# Patient Record
Sex: MALE | Race: WHITE | NOT HISPANIC OR LATINO | URBAN - METROPOLITAN AREA
[De-identification: names, ages, dates, MRNs, and addresses within clinical notes are randomized per-mention and may not be internally consistent; named-entity substitution may affect disease eponyms.]

---

## 2024-11-15 ENCOUNTER — TELEPHONE (OUTPATIENT)
Dept: NEUROLOGY | Facility: CLINIC | Age: 69
End: 2024-11-15
Payer: COMMERCIAL

## 2024-11-15 NOTE — TELEPHONE ENCOUNTER
Called patient again with no answer.  Referral needed for scheduling.  Patient to contact the office back.

## 2024-11-15 NOTE — TELEPHONE ENCOUNTER
----- Message from Ghada sent at 11/15/2024  1:04 PM CST -----  Regarding: No availability  Contact: Pt @[phone]  Pt called in to schedule an appt; no available appts in Epic. Pt is asking for a call back soon to schedule. Thanks.         Patient's DX: movement disorders         Patient requesting call back or [facility] [phone]

## 2024-11-18 ENCOUNTER — TELEPHONE (OUTPATIENT)
Dept: NEUROLOGY | Facility: CLINIC | Age: 69
End: 2024-11-18
Payer: COMMERCIAL

## 2024-11-18 NOTE — TELEPHONE ENCOUNTER
----- Message from Aissatou Chacko sent at 11/18/2024  1:32 PM CST -----  Regarding: Returning Missed Call  Contact: [phone]  Returning a Missed Call    Caller: Patient     Returning call to: CAROL Shelby     Caller can be reached @: [phone]

## 2024-12-02 ENCOUNTER — PATIENT MESSAGE (OUTPATIENT)
Dept: NEUROLOGY | Facility: CLINIC | Age: 69
End: 2024-12-02

## 2024-12-02 ENCOUNTER — OFFICE VISIT (OUTPATIENT)
Dept: NEUROLOGY | Facility: CLINIC | Age: 69
End: 2024-12-02
Payer: COMMERCIAL

## 2024-12-02 ENCOUNTER — LAB VISIT (OUTPATIENT)
Dept: LAB | Facility: HOSPITAL | Age: 69
End: 2024-12-02
Attending: STUDENT IN AN ORGANIZED HEALTH CARE EDUCATION/TRAINING PROGRAM
Payer: COMMERCIAL

## 2024-12-02 VITALS
DIASTOLIC BLOOD PRESSURE: 77 MMHG | HEART RATE: 67 BPM | SYSTOLIC BLOOD PRESSURE: 124 MMHG | HEIGHT: 63 IN | WEIGHT: 167 LBS | BODY MASS INDEX: 29.59 KG/M2

## 2024-12-02 DIAGNOSIS — R26.9 ABNORMAL GAIT: ICD-10-CM

## 2024-12-02 DIAGNOSIS — R26.89 BALANCE DISORDER: Primary | ICD-10-CM

## 2024-12-02 PROCEDURE — 36415 COLL VENOUS BLD VENIPUNCTURE: CPT | Performed by: STUDENT IN AN ORGANIZED HEALTH CARE EDUCATION/TRAINING PROGRAM

## 2024-12-02 PROCEDURE — 99205 OFFICE O/P NEW HI 60 MIN: CPT | Mod: S$GLB,,, | Performed by: STUDENT IN AN ORGANIZED HEALTH CARE EDUCATION/TRAINING PROGRAM

## 2024-12-02 PROCEDURE — 99999 PR PBB SHADOW E&M-EST. PATIENT-LVL III: CPT | Mod: PBBFAC,,, | Performed by: STUDENT IN AN ORGANIZED HEALTH CARE EDUCATION/TRAINING PROGRAM

## 2024-12-02 PROCEDURE — 84425 ASSAY OF VITAMIN B-1: CPT | Performed by: STUDENT IN AN ORGANIZED HEALTH CARE EDUCATION/TRAINING PROGRAM

## 2024-12-02 PROCEDURE — 82607 VITAMIN B-12: CPT | Performed by: STUDENT IN AN ORGANIZED HEALTH CARE EDUCATION/TRAINING PROGRAM

## 2024-12-02 PROCEDURE — G2211 COMPLEX E/M VISIT ADD ON: HCPCS | Mod: S$GLB,,, | Performed by: STUDENT IN AN ORGANIZED HEALTH CARE EDUCATION/TRAINING PROGRAM

## 2024-12-02 NOTE — PROGRESS NOTES
Name: Gualberto Brandon  MRN: [id number]   CSN: [id number]      Date: 12/02/2024    Referring physician:  Perico Campbell III, MD  [facility] [address] [address]    Chief Complaint / Interval History: Poor Balance    Prior Neuro Consult 5/2023 at [facility]  Patient with family history of dementia, who presents with a 6-month history of intermittent orthostatic complaints associated with a sense of increasing imbalance, which have improved over the past several weeks.  There are no associated cognitive complaints.  Structural imaging is reviewed and is notable for age-related atrophy with commensurate expansion of the lateral ventricles.  There are no features concerning for normal pressure hydrocephalus.     The cause of intermittent orthostatic symptoms and imbalance remains unclear, although a neurodegenerative etiology is unlikely.  Rather, I suspect that use of cognitive/balance impairing medications (Ambien, clonazepam, Viibryd, Adderall) may all contribute to symptoms and to orthostatic complaints.  The patient does have a history of cervical fusion and lumbar diskectomy.  Cord compression provides an alternate explanation for bowel incontinence, which requires reassessment via neuroimaging.      History of Present Illness (HPI):  Mr. Brandon is a 70 yo LH man who presents for abnormal balance. His symptoms began acutely in early 2023. He describes waking up and feeling that the room was spinning which caused a fall. His balance has been poor (but fluctuating) since then. Again 6-8 months later he had an episode of room spinning but was then able to play pickle ball that same day without issue. He has had a handful of falls due to his imbalance. He also feels he sometimes struggles with steps. Unclear how much his vision is contributing as he has severe glaucoma (R>>L). He denies any sensory changes in his feet. He has not done in PT. He denies ETOH use. Polypharmacy likely  contributing. Feels that his symptoms may be exacerbated by Ozempic. Has never seen ENT. He did have a period of time where fecal incontinence was an issue. He feels this was related to a glaucoma medication that has side effect of diarrhea and has improved. There was no evidence of cord compression on spinal imaging. He was also worked up for NPH however this was thought to be less likely.     MRI Brain 4/2023  1. Ventriculomegaly is presumed due to central involutional changes. Otherwise, no acute intracranial process is identified.   2. Mild supratentorial and minimal pontine white matter microvascular ischemic changes.   3. Mild scattered chronic sinus disease.         MRI Cervical/Thoracic 4/2023  CERVICAL: Stable appearance of C4-C6 ACDF with interbody fusion. Evaluation of the cervical spinal   cord is unremarkable for intrinsic signal abnormality. Mild broad-based disc osteophyte complex   eccentric to the right C3-4 with moderate right foraminal narrowing. Bilateral uncovertebral   spurring C6-C7, left greater than right with moderate left and mild right foraminal narrowing. No   evidence of alan disc herniation or significant central spinal canal narrowing.       THORACIC: Stable curvature of the lower thoracic spine, apex at T9. Evaluation of the thoracic   spinal cord is unremarkable for intrinsic signal abnormality. Multilevel disc bulging and   uncovertebral spurring. Left-sided uncovertebral spurring T6-7 causes moderate left foraminal   narrowing. Right posterior lateral disc/uncinate spur T9-10 flattens the right ventral thecal sac   and causes moderate right foraminal narrowing. Right posterior lateral disc/uncovertebral spurring   T10-11 with right-sided facet degenerative changes contributes to mild-moderate central spinal canal   narrowing and moderate to severe right foraminal narrowing. On sagittal views, there is severe disc   degeneration with Modic type I changes at L1-2 with probable  "severe left foraminal narrowing   although previously enlarged disc herniation posterior to L1 on 2019 examination of  is no   longer visualized. This could be further evaluated with dedicated MR imaging of the lumbar spine if   clinically indicated.     Current Mvmt Medications:  Acetazolamide 500mg ER BID   Lamictal 400mg --> 300mg  Burproprion 400 --> 150mg  Clonazepam 0.5mg BID most days   TPM?  Ozempic -- symptom onset around this time. Worsens with each dosage increase?    Prior Mvmt Medication Trials:      Nonmotor ROS:  Smell/Taste: slightly poor but has been this way forever   Voice/Swallowing: voice is a bit softer, now swallowing issues   Gait/Falls: the balance fluctuates - has fallen 2-3 times since onset   Dizziness:  at least two episodes of vertigo like dizziness - this is not consistent   Hydration: could do better   Urinary Issues: none   Constipation: none  - more diarrhea  - fecal incontinence a few times  Sleep/RBD: 2x/year where he has active dreams - has RLS  Hallucinations/Peripheral Illusions: sometimes has peripheral illusions more recently   Memory/Cognition/Language: doing ok   Mood: very anxious     Past Medical History: Glaucoma, Anxiety, Depression, Spinal stenosis     Relevant Surgical History: No past surgical history on file.    Social History: The patient denies ETOH, drug use or tobacco.     Family History: Their family history is not on file.  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      His mother  at age 77 (kidney disease). His father  with symptoms of dementia at age 83 (AAO = 81). He is the youngest of 4 sisters. His oldest sister was evaluated for "severe memory problems" that were treated via ECT. She is now  (age 82) with a diagnosis of dementia/depression.      Allergies: Patient has no known allergies.     Meds:   Current Outpatient Medications on File Prior to Visit   Medication Sig Dispense Refill    " acetaZOLAMIDE (DIAMOX) 250 MG tablet Take 1 tablet (250 mg total) by mouth 4 (four) times daily as directed. 120 tablet 1    acetaZOLAMIDE (DIAMOX) 250 MG tablet Take 1 tablet (250 mg total) by mouth 4 (four) times daily as directed. 120 tablet 1    acetaZOLAMIDE (DIAMOX) 250 MG tablet Take 1 tablet by mouth four times a day as directed 120 tablet 1    acetaZOLAMIDE (DIAMOX) 500 mg CpSR Take 1 capsule (500 mg total) by mouth once daily. 30 capsule 1    amoxicillin (AMOXIL) 875 MG tablet Take 1 tablet by mouth 2 times per day for 10 days. Start day of surgery. 20 tablet 0    buPROPion (WELLBUTRIN SR) 150 MG TBSR 12 hr tablet take 1 tablet by mouth 1 time per day 90 tablet 0    buPROPion (WELLBUTRIN SR) 200 MG SR12 Take 1 tablet by mouth once daily 90 tablet 3    cephALEXin (KEFLEX) 500 MG capsule take 1 capsule by mouth twice daily 60 capsule 1    cephALEXin (KEFLEX) 500 MG capsule Take 1 capsule (500 mg total) by mouth 2 (two) times a day. 60 capsule 1    cephALEXin (KEFLEX) 500 MG capsule Take 1 capsule (500 mg total) by mouth 2 (two) times a day. 60 capsule 1    cephALEXin (KEFLEX) 500 MG capsule Take 1 capsule (500 mg total) by mouth 2 (two) times daily. 60 capsule 1    chlorhexidine (PERIDEX) 0.12 % solution Rinse with ½ to 1 ounce (15 to 30 mL) by mouth 2 times per day. Start day after surgery, then stop. 473 mL 0    clonazePAM (KLONOPIN) 0.5 MG tablet Take up to 1.5 tablets by mouth daily as needed for anxiety 135 tablet 0    clonazePAM (KLONOPIN) 0.5 MG tablet Take up to 1.5 tablets daily as needed for anxiety 135 tablet 0    dextroamphetamine-amphetamine (ADDERALL) 30 mg Tab 1 Tablet(s) by mouth 1 times a day 30 tablet 0    dorzolamide-timolol 2-0.5% (COSOPT) 22.3-6.8 mg/mL ophthalmic solution Place 1 drop into both eyes 3 times a day 10 mL 3    dorzolamide-timolol 2-0.5% (COSOPT) 22.3-6.8 mg/mL ophthalmic solution Instill 1 drop into both eyes 3 times a day 10 mL 3    dorzolamide-timolol 2-0.5% (COSOPT)  22.3-6.8 mg/mL ophthalmic solution Place 1 drop into both eyes 3 (three) times daily. 10 mL 3    dorzolamide-timolol 2-0.5% (COSOPT) 22.3-6.8 mg/mL ophthalmic solution Place 1 drop into both eyes 3 (three) times daily. 10 mL 3    dorzolamide-timolol 2-0.5% (COSOPT) 22.3-6.8 mg/mL ophthalmic solution Place 1 drop into both eyes 2 (two) times a day. 10 mL 2    empagliflozin (JARDIANCE) 25 mg tablet Take 1 tablet by mouth daily 90 tablet 3    ibuprofen (ADVIL,MOTRIN) 800 MG tablet Take 1 tablet by mouth 3 times per day as needed for pain 16 tablet 0    ketorolac 0.5% (ACULAR) 0.5 % Drop Place 1 drop into the right eye 4 times daily. FOR AFTER SURGERY 10 mL 3    lamoTRIgine (LAMICTAL) 200 MG tablet Take 2 tablets by mouth once daily 180 tablet 3    losartan (COZAAR) 100 MG tablet TAKE 1/2 TABLET BY MOUTH TWICE DAILY AS DIRECTED 90 tablet 3    mercaptopurine (PURINETHOL) 50 mg tablet Take 1.5 tablets (75 mg total) by mouth every morning. 180 tablet 7    mercaptopurine (PURINETHOL) 50 mg tablet 1 1/2 TABLET BY MOUTH EVERY MORNING 180 tablet 5    mercaptopurine (PURINETHOL) 50 mg tablet take 1 and 1/2 TABLET BY MOUTH EVERY MORNING 180 tablet 5    metoprolol succinate (TOPROL-XL) 25 MG 24 hr tablet Take 1 tablet by mouth daily 90 tablet 2    netarsudiL-latanoprost (ROCKLATAN) 0.02-0.005 % Drop instill 1 drop into both eyes as directed 2.5 mL 4    netarsudiL-latanoprost (ROCKLATAN) 0.02-0.005 % Drop Instill 1 drop into both eyes as directed 30 mL 0    netarsudiL-latanoprost (ROCKLATAN) 0.02-0.005 % Drop Place 1 drop into both eyes nightly at bedtime as directed. 2.5 mL 11    netarsudiL-latanoprost (ROCKLATAN) 0.02-0.005 % Drop Place 1 drop into both eyes nightly at bedtime as directed. 2.5 mL 11    ofloxacin (OCUFLOX) 0.3 % ophthalmic solution Place 1 drop in right eye four times daily 5 mL 2    [id number] sul-NaCl-KCl-asb-C (PLENVU) 140-9-5.2 gram PPkS use as directed 3 packet 0    prednisoLONE acetate (PRED FORTE) 1 %  DrpS Place 1 drop in right eye four times daily 15 mL 2    prednisoLONE acetate (PRED FORTE) 1 % DrpS Place 1 drop into the right eye every 2 hours (while awake). FOR AFTER SURGERY 20 mL 4    rosuvastatin (CRESTOR) 20 MG tablet Take 1 tablet (20 mg total) by mouth once daily. 90 tablet 3    rosuvastatin (CRESTOR) 20 MG tablet Take 1 tablet by mouth daily 90 tablet 2    semaglutide (OZEMPIC) 0.25 mg or 0.5 mg (2 mg/3 mL) pen injector Inject 0.25 mg into the skin once a week 3 mL 1    semaglutide (OZEMPIC) 0.25 mg or 0.5 mg (2 mg/3 mL) pen injector Inject 0.25 mg into the skin once a week 3 mL 1    semaglutide (OZEMPIC) 2 mg/dose (8 mg/3 mL) PnIj Inject 2 mg into the skin once a week 3 mL 2    semaglutide (OZEMPIC) 2 mg/dose (8 mg/3 mL) PnIj Inject 2 mg into the skin once a week 3 mL 2    semaglutide (OZEMPIC) 2 mg/dose (8 mg/3 mL) PnIj Inject 2 mg into the skin once a week 3 mL 2    tadalafiL (CIALIS) 20 MG Tab Take 1 tablet by mouth as needed for erectile dysfunction 18 tablet 10    tadalafiL (CIALIS) 20 MG Tab Take 1 tablet by mouth oas needed for erectile dysfunction 18 tablet 10    topiramate (TOPAMAX) 200 MG Tab Take 2 tablets by mouth once daily (at bedtime) 180 tablet 3    traMADoL (ULTRAM) 50 mg tablet Take 1 tablet by mouth every 6 hours as needed for pain 12 tablet 0    traZODone (DESYREL) 100 MG tablet Take 1 and 1/2 tablet by mouth every night at bedtime 135 tablet 3    traZODone (DESYREL) 100 MG tablet Take 2 tablets (200 mg total) by mouth nightly. 180 tablet 2    vilazodone (VIIBRYD) 20 mg Tab Take 1 tablet by mouth every night at bedtime 90 tablet 3    zolpidem (AMBIEN) 5 MG Tab Take 1 and 1/2 tablets by mouth once daily. 135 tablet 1    zolpidem (AMBIEN) 5 MG Tab Take 1 tablet by mouth every night at bedtime 90 tablet 0    [DISCONTINUED] latanoprost 0.005 % ophthalmic solution Instill 1 drop into both eyes every evening 17.5 mL 0     No current facility-administered medications on file prior to  "visit.       Exam:  /77 (Patient Position: Sitting)   Pulse 67   Ht 5' 3" (1.6 m)   Wt 75.8 kg (167 lb)   BMI 29.58 kg/m²     Constitutional  Well-developed, well-nourished, appears stated age   Cardiovascular  No LE edema bilaterally   Neurological    * Mental status  MOCA = not done during today's visit     - Orientation  Oriented to conversation     - Memory   Intact recent and remote     - Attention/concentration  Attentive, vigilant during exam     - Language  Intact to conversation.     - Fund of knowledge  Aware of current events     - Executive  Well-organized thoughts     - Other     * Cranial nerves       - CN II  Pupils equal, visual fields full to confrontation  - Some difficulties with vision on the right     - CN III, IV, VI  Extraocular movements full, normal pursuits and saccades         - CN VII  Face strong and symmetric bilaterally     - CN VIII  Hearing intact bilaterally         - CN XI  SCM and trapezius 5/5 bilaterally     - CN XII  Tongue midline   * Motor  Grossly intact   * Sensory   Intact to light touch and temperature throughout - maybe mildly reduced vibration sensation at the ankles bilaterally    * Coordination  No dysmetria with finger-to-nose or heel-to-shin however there was some difficulties getting to target on the right with FNF improved with re-focusing    There was no apraxia or myoclonus.     Romberg negative.    * Gait  See below.   * Deep tendon reflexes  2+ and symmetric throughout   Negative owens    * Specialized movement exam Gen: normal facial expressions, anxious appearing   Speech: slightly hypophonic  Tremor: very mild left hand rest tremor - same with posture and action   - some difficulties with FNF on the right (likely vision related)   Bradykinesia: none - just needs reassurance   Tone: Maybe slightly increased in the RUE but had a very difficult time relaxing  Gait: right leg appears maybe a little dystonic - only has difficulties with turning to " the right - able to walk tandem with some difficulty  Pull Test: negative      Medical Record Review:  Labs, imaging and prior notes reviewed independently.     See above.     Diagnoses:          1. Balance disorder  Ambulatory referral/consult to ENT      2. Abnormal gait  Vitamin B12 Deficiency Panel    VITAMIN B1    Ambulatory referral/consult to Physical/Occupational Therapy    Ambulatory referral/consult to ENT          Assessment:  Mr. Brandon is a 68 yo LH man who presents today for abnormal balance. He has a family history of dementia. He denies any cognitive issues himself. He has debilitating anxiety that is not new. I believe his balance difficulties are multi-factorial in the setting of his vision issues, spinal stenosis, anxiety, polypharmacy and the possibility of nutritional deficiency. I was able to review his MRI brain. I do think that there is perhaps some asymmetric hemispheric atrophy involving the right brain however he does not have any evidence of neglect, myoclonus, apraxia or aphasia to indicate atypical parkinsonism. He does not have bradykinesia on exam today. Will need to continue to monitor with time.     Plan:  - Will check vitamin deficiencies.   - Neuro PT for balance.   - ENT referral to ensure no inner ear pathology.   - Encouraged to correct eye pathology as this is likely contributing to his balance as well.   - Continue to work with psychiatry to optimize anxiety/depression regimen.   - Will need continued monitoring as above.     I spoke with his daughter Madeleine Lowe MD over the phone to discuss the above plan as well.     The visit today is associated with current or anticipated ongoing medical care related to this patients complex condition. Patient should RTC in 3 months to see me.     Total time: 88 minutes spent on the encounter, which includes face to face time and non-face to face time preparing to see the patient (eg, review of tests), Obtaining and/or reviewing  separately obtained history, Documenting clinical information in the electronic or other health record, Independently interpreting results (not separately reported) and communicating results to the patient/family/caregiver, or Care coordination (not separately reported).     Barbara Waldrop MD  Division of Movement and Memory Disorders  [facility]  [phone]

## 2024-12-03 ENCOUNTER — CLINICAL SUPPORT (OUTPATIENT)
Dept: AUDIOLOGY | Facility: CLINIC | Age: 69
End: 2024-12-03
Payer: COMMERCIAL

## 2024-12-03 ENCOUNTER — PATIENT MESSAGE (OUTPATIENT)
Dept: OTOLARYNGOLOGY | Facility: CLINIC | Age: 69
End: 2024-12-03

## 2024-12-03 ENCOUNTER — OFFICE VISIT (OUTPATIENT)
Dept: OTOLARYNGOLOGY | Facility: CLINIC | Age: 69
End: 2024-12-03
Payer: COMMERCIAL

## 2024-12-03 DIAGNOSIS — R26.89 BALANCE DISORDER: ICD-10-CM

## 2024-12-03 DIAGNOSIS — H90.3 SENSORINEURAL HEARING LOSS, BILATERAL: Primary | ICD-10-CM

## 2024-12-03 DIAGNOSIS — R26.9 ABNORMAL GAIT: ICD-10-CM

## 2024-12-03 DIAGNOSIS — R42 DIZZINESS AND GIDDINESS: ICD-10-CM

## 2024-12-03 LAB — VIT B12 SERPL-MCNC: 844 NG/L (ref 180–914)

## 2024-12-03 PROCEDURE — 99205 OFFICE O/P NEW HI 60 MIN: CPT | Mod: S$GLB,,, | Performed by: OTOLARYNGOLOGY

## 2024-12-03 PROCEDURE — 99999 PR PBB SHADOW E&M-EST. PATIENT-LVL IV: CPT | Mod: PBBFAC,,, | Performed by: OTOLARYNGOLOGY

## 2024-12-03 PROCEDURE — 99999 PR PBB SHADOW E&M-EST. PATIENT-LVL I: CPT | Mod: PBBFAC,,, | Performed by: AUDIOLOGIST

## 2024-12-03 PROCEDURE — 92557 COMPREHENSIVE HEARING TEST: CPT | Mod: S$GLB,,, | Performed by: AUDIOLOGIST

## 2024-12-03 PROCEDURE — 92567 TYMPANOMETRY: CPT | Mod: S$GLB,,, | Performed by: AUDIOLOGIST

## 2024-12-03 NOTE — PROGRESS NOTES
Subjective     Patient ID: Gualberto Brandon is a 69 y.o. male.    Chief Complaint: Balance issues.    Ref Dr Waldrop/ neurology    Hx of 6-12 mos of imbalance while walking/ turning.    Some gait issues/ falls.     Has OA/ tremor.  NPH W/U neg.    No ear sx's. No BPPV sx.    Two episodes of sub vertigo in past.    Past Medical History: Patient has no past medical history on file.    Past Surgical History: Patient has no past surgical history on file.    Social History: Patient     Family History: family history is not on file.    Medications:   Current Outpatient Medications   Medication Sig    acetaZOLAMIDE (DIAMOX) 250 MG tablet Take 1 tablet (250 mg total) by mouth 4 (four) times daily as directed.    acetaZOLAMIDE (DIAMOX) 250 MG tablet Take 1 tablet (250 mg total) by mouth 4 (four) times daily as directed.    acetaZOLAMIDE (DIAMOX) 250 MG tablet Take 1 tablet by mouth four times a day as directed    acetaZOLAMIDE (DIAMOX) 500 mg CpSR Take 1 capsule (500 mg total) by mouth once daily.    amoxicillin (AMOXIL) 875 MG tablet Take 1 tablet by mouth 2 times per day for 10 days. Start day of surgery.    buPROPion (WELLBUTRIN SR) 150 MG TBSR 12 hr tablet take 1 tablet by mouth 1 time per day    buPROPion (WELLBUTRIN SR) 200 MG SR12 Take 1 tablet by mouth once daily    cephALEXin (KEFLEX) 500 MG capsule take 1 capsule by mouth twice daily    cephALEXin (KEFLEX) 500 MG capsule Take 1 capsule (500 mg total) by mouth 2 (two) times a day.    cephALEXin (KEFLEX) 500 MG capsule Take 1 capsule (500 mg total) by mouth 2 (two) times a day.    cephALEXin (KEFLEX) 500 MG capsule Take 1 capsule (500 mg total) by mouth 2 (two) times daily.    chlorhexidine (PERIDEX) 0.12 % solution Rinse with ½ to 1 ounce (15 to 30 mL) by mouth 2 times per day. Start day after surgery, then stop.    clonazePAM (KLONOPIN) 0.5 MG tablet Take up to 1.5 tablets by mouth daily as needed for anxiety    clonazePAM (KLONOPIN) 0.5 MG tablet Take up to  1.5 tablets daily as needed for anxiety    dextroamphetamine-amphetamine (ADDERALL) 30 mg Tab 1 Tablet(s) by mouth 1 times a day    dorzolamide-timolol 2-0.5% (COSOPT) 22.3-6.8 mg/mL ophthalmic solution Place 1 drop into both eyes 3 times a day    dorzolamide-timolol 2-0.5% (COSOPT) 22.3-6.8 mg/mL ophthalmic solution Instill 1 drop into both eyes 3 times a day    dorzolamide-timolol 2-0.5% (COSOPT) 22.3-6.8 mg/mL ophthalmic solution Place 1 drop into both eyes 3 (three) times daily.    dorzolamide-timolol 2-0.5% (COSOPT) 22.3-6.8 mg/mL ophthalmic solution Place 1 drop into both eyes 3 (three) times daily.    dorzolamide-timolol 2-0.5% (COSOPT) 22.3-6.8 mg/mL ophthalmic solution Place 1 drop into both eyes 2 (two) times a day.    empagliflozin (JARDIANCE) 25 mg tablet Take 1 tablet by mouth daily    ibuprofen (ADVIL,MOTRIN) 800 MG tablet Take 1 tablet by mouth 3 times per day as needed for pain    ketorolac 0.5% (ACULAR) 0.5 % Drop Place 1 drop into the right eye 4 times daily. FOR AFTER SURGERY    lamoTRIgine (LAMICTAL) 200 MG tablet Take 2 tablets by mouth once daily    losartan (COZAAR) 100 MG tablet TAKE 1/2 TABLET BY MOUTH TWICE DAILY AS DIRECTED    mercaptopurine (PURINETHOL) 50 mg tablet Take 1.5 tablets (75 mg total) by mouth every morning.    mercaptopurine (PURINETHOL) 50 mg tablet 1 1/2 TABLET BY MOUTH EVERY MORNING    mercaptopurine (PURINETHOL) 50 mg tablet take 1 and 1/2 TABLET BY MOUTH EVERY MORNING    metoprolol succinate (TOPROL-XL) 25 MG 24 hr tablet Take 1 tablet by mouth daily    netarsudiL-latanoprost (ROCKLATAN) 0.02-0.005 % Drop instill 1 drop into both eyes as directed    netarsudiL-latanoprost (ROCKLATAN) 0.02-0.005 % Drop Instill 1 drop into both eyes as directed    netarsudiL-latanoprost (ROCKLATAN) 0.02-0.005 % Drop Place 1 drop into both eyes nightly at bedtime as directed.    netarsudiL-latanoprost (ROCKLATAN) 0.02-0.005 % Drop Place 1 drop into both eyes nightly at bedtime as directed.     ofloxacin (OCUFLOX) 0.3 % ophthalmic solution Place 1 drop in right eye four times daily    [id number] sul-NaCl-KCl-asb-C (PLENVU) 140-9-5.2 gram PPkS use as directed    prednisoLONE acetate (PRED FORTE) 1 % DrpS Place 1 drop in right eye four times daily    prednisoLONE acetate (PRED FORTE) 1 % DrpS Place 1 drop into the right eye every 2 hours (while awake). FOR AFTER SURGERY    rosuvastatin (CRESTOR) 20 MG tablet Take 1 tablet (20 mg total) by mouth once daily.    rosuvastatin (CRESTOR) 20 MG tablet Take 1 tablet by mouth daily    semaglutide (OZEMPIC) 0.25 mg or 0.5 mg (2 mg/3 mL) pen injector Inject 0.25 mg into the skin once a week    semaglutide (OZEMPIC) 0.25 mg or 0.5 mg (2 mg/3 mL) pen injector Inject 0.25 mg into the skin once a week    semaglutide (OZEMPIC) 2 mg/dose (8 mg/3 mL) PnIj Inject 2 mg into the skin once a week    semaglutide (OZEMPIC) 2 mg/dose (8 mg/3 mL) PnIj Inject 2 mg into the skin once a week    semaglutide (OZEMPIC) 2 mg/dose (8 mg/3 mL) PnIj Inject 2 mg into the skin once a week    tadalafiL (CIALIS) 20 MG Tab Take 1 tablet by mouth as needed for erectile dysfunction    tadalafiL (CIALIS) 20 MG Tab Take 1 tablet by mouth oas needed for erectile dysfunction    topiramate (TOPAMAX) 200 MG Tab Take 2 tablets by mouth once daily (at bedtime)    traMADoL (ULTRAM) 50 mg tablet Take 1 tablet by mouth every 6 hours as needed for pain    traZODone (DESYREL) 100 MG tablet Take 1 and 1/2 tablet by mouth every night at bedtime    traZODone (DESYREL) 100 MG tablet Take 2 tablets (200 mg total) by mouth nightly.    vilazodone (VIIBRYD) 20 mg Tab Take 1 tablet by mouth every night at bedtime    zolpidem (AMBIEN) 5 MG Tab Take 1 and 1/2 tablets by mouth once daily.    zolpidem (AMBIEN) 5 MG Tab Take 1 tablet by mouth every night at bedtime     No current facility-administered medications for this visit.       Allergies: Patient has No Known Allergies.    HPI  Review of Systems   Constitutional:  Negative.  Negative for activity change, appetite change, chills, diaphoresis, fatigue, fever and unexpected weight change.   HENT:  Positive for voice change. Negative for nasal congestion, ear discharge, ear pain, facial swelling, hearing loss, nosebleeds, postnasal drip, rhinorrhea, sinus pressure/congestion, sneezing, sore throat, tinnitus and trouble swallowing.    Eyes:  Negative for photophobia, pain, discharge, redness and visual disturbance.   Respiratory: Negative.  Negative for cough, chest tightness, shortness of breath and wheezing.    Cardiovascular: Negative.  Negative for chest pain and palpitations.   Gastrointestinal:  Positive for diarrhea and vomiting. Negative for abdominal pain, constipation and nausea.   Genitourinary: Negative.  Negative for dysuria and frequency.   Musculoskeletal:  Positive for back pain and gait problem. Negative for arthralgias, joint swelling, myalgias, neck pain and neck stiffness.   Integumentary:  Negative for color change, pallor and rash. Negative.   Allergic/Immunologic: Negative.    Neurological:  Positive for tremors. Negative for dizziness, seizures, syncope, facial asymmetry, speech difficulty, weakness, light-headedness, numbness and headaches.   Hematological: Negative.  Negative for adenopathy. Does not bruise/bleed easily.   Psychiatric/Behavioral:  Negative for agitation, confusion, decreased concentration, dysphoric mood and sleep disturbance. The patient is nervous/anxious. The patient is not hyperactive.           Objective     Physical Exam  Constitutional:       General: He is not in acute distress.     Appearance: Normal appearance. He is well-developed. He is not ill-appearing, toxic-appearing or diaphoretic.   HENT:      Head: Not macrocephalic and not microcephalic. No raccoon eyes, Gutierrez's sign, abrasion, contusion, right periorbital erythema, left periorbital erythema or laceration. Hair is normal.      Jaw: No trismus.      Right Ear: No  decreased hearing noted. No laceration, drainage, swelling or tenderness. No middle ear effusion. No foreign body. No mastoid tenderness. No hemotympanum. Tympanic membrane is not injected, scarred, perforated, erythematous, retracted or bulging. Tympanic membrane has normal mobility.      Left Ear: No decreased hearing noted. No laceration, drainage, swelling or tenderness.  No middle ear effusion. No foreign body. No mastoid tenderness. No hemotympanum. Tympanic membrane is not injected, scarred, perforated, erythematous, retracted or bulging. Tympanic membrane has normal mobility.      Ears:      Comments:   CN II-XII nl.    EOM's nl.    Cereb: mild dysmetria.    RomB: sways.         Nose: No nasal deformity, septal deviation, laceration, mucosal edema or rhinorrhea.      Right Sinus: No maxillary sinus tenderness.      Left Sinus: No maxillary sinus tenderness.      Mouth/Throat:      Mouth: Mucous membranes are not pale, not dry and not cyanotic. No lacerations or oral lesions.      Dentition: Normal dentition. No dental caries or dental abscesses.      Pharynx: Uvula midline. No oropharyngeal exudate or uvula swelling.      Tonsils: No tonsillar abscesses.   Eyes:      General: No scleral icterus.        Right eye: No foreign body or discharge.         Left eye: No foreign body or discharge.      Extraocular Movements:      Right eye: Normal extraocular motion and no nystagmus.      Left eye: Normal extraocular motion and no nystagmus.      Conjunctiva/sclera:      Right eye: Right conjunctiva is not injected. No chemosis, exudate or hemorrhage.     Left eye: Left conjunctiva is not injected. No chemosis, exudate or hemorrhage.     Pupils: Pupils are equal.      Right eye: Pupil is round and reactive.      Left eye: Pupil is round and reactive.   Neck:      Thyroid: No thyroid mass or thyromegaly.      Vascular: No JVD.      Trachea: No tracheal tenderness or tracheal deviation.   Cardiovascular:      Rate and  Rhythm: Normal rate and regular rhythm.   Pulmonary:      Effort: No tachypnea, bradypnea, accessory muscle usage or respiratory distress.      Breath sounds: Normal breath sounds. No stridor.   Abdominal:      Palpations: Abdomen is soft.   Musculoskeletal:         General: Normal range of motion.      Cervical back: No edema, erythema or rigidity. No muscular tenderness. Normal range of motion.   Lymphadenopathy:      Head:      Right side of head: No submental, submandibular, tonsillar, preauricular, posterior auricular or occipital adenopathy.      Left side of head: No submental, submandibular, tonsillar, preauricular, posterior auricular or occipital adenopathy.      Cervical: No cervical adenopathy.      Right cervical: No superficial, deep or posterior cervical adenopathy.     Left cervical: No superficial, deep or posterior cervical adenopathy.   Skin:     Coloration: Skin is not pale.      Findings: No abrasion, bruising, burn, ecchymosis, erythema, laceration, lesion or rash.   Neurological:      Mental Status: He is alert and oriented to person, place, and time. He is not disoriented.      Cranial Nerves: No cranial nerve deficit.      Sensory: No sensory deficit.      Motor: No tremor, atrophy, abnormal muscle tone or seizure activity.   Psychiatric:         Mood and Affect: Mood is not anxious or depressed. Affect is not labile, blunt, angry or inappropriate.         Speech: He is communicative. Speech is not rapid and pressured, delayed, slurred or tangential.         Behavior: Behavior normal. Behavior is not agitated, aggressive, withdrawn, hyperactive or combative.         Thought Content: Thought content normal.         Cognition and Memory: Memory is not impaired. He does not exhibit impaired recent memory or impaired remote memory.            Assessment and Plan     1. Abnormal gait  -     Ambulatory referral/consult to ENT    2. Balance disorder  -     Ambulatory referral/consult to  ENT        Most likely multifactoral.    Schedule VNG    F/U with me after         No follow-ups on file.

## 2024-12-03 NOTE — PROGRESS NOTES
Audiologic Evaluation 12/3/2024:       Gualberto Brandon, a 69 y.o. male, was seen today in the clinic for an audiologic evaluation.  Mr. Brandon reported imbalance for approximately the past 6-12 months and two episodes of vertigo. He denied perceived hearing loss, but noted his wife sometimes says he is not hearing well. Mr. Brandon denied otalgia and tinnitus.     Tympanometry was attempted, but could not be obtained due to excessive hair in the ear canal. Audiogram results revealed mild rising to normal and sloping to moderate sensorineural hearing loss in the right ear and mild high frequency sensorineural hearing loss in the left ear.  Speech reception thresholds were noted at 15 dB in the right ear and 15 dB in the left ear.  Speech discrimination scores were 100% in the right ear and 100% in the left ear.    Recommendations:  Otologic evaluation  Annual audiogram  Hearing protection when in noise

## 2024-12-04 ENCOUNTER — TELEPHONE (OUTPATIENT)
Dept: NEUROLOGY | Facility: CLINIC | Age: 69
End: 2024-12-04
Payer: COMMERCIAL

## 2024-12-04 NOTE — TELEPHONE ENCOUNTER
----- Message from Shaniqua sent at 12/4/2024 12:39 PM CST -----  Regarding: PT Advise  Contact: [phone]  MAGDA DAVIDSON calling regarding Patient Advice (message) for # pt has not heard from PT and did not know how long to wait to hear from office pls advise

## 2024-12-04 NOTE — TELEPHONE ENCOUNTER
----- Message from Kaya sent at 12/4/2024  3:09 PM CST -----  Name of Who is Calling: MAGDA SEGUNDO [[id number]]      What is the request in detail: pt was never contacted by the place who received his physical therapy orders. Please advise       Can the clinic reply by [facility]: No       What Number to Call Back if not in [facility]: Telephone Information:  [employer]          [phone]

## 2024-12-05 ENCOUNTER — OFFICE VISIT (OUTPATIENT)
Dept: OPHTHALMOLOGY | Facility: CLINIC | Age: 69
End: 2024-12-05
Payer: COMMERCIAL

## 2024-12-05 ENCOUNTER — TELEPHONE (OUTPATIENT)
Dept: NEUROLOGY | Facility: CLINIC | Age: 69
End: 2024-12-05
Payer: COMMERCIAL

## 2024-12-05 DIAGNOSIS — H21.562 AFFERENT PUPILLARY DEFECT OF LEFT EYE: ICD-10-CM

## 2024-12-05 DIAGNOSIS — Z96.1 PSEUDOPHAKIA OF BOTH EYES: ICD-10-CM

## 2024-12-05 DIAGNOSIS — H40.1194 PRIMARY OPEN-ANGLE GLAUCOMA, INDETERMINATE STAGE, UNSPECIFIED LATERALITY: Primary | ICD-10-CM

## 2024-12-05 DIAGNOSIS — H40.1133 PRIMARY OPEN ANGLE GLAUCOMA (POAG) OF BOTH EYES, SEVERE STAGE: Primary | ICD-10-CM

## 2024-12-05 PROCEDURE — 92020 GONIOSCOPY: CPT | Mod: S$GLB,,, | Performed by: OPHTHALMOLOGY

## 2024-12-05 PROCEDURE — 99204 OFFICE O/P NEW MOD 45 MIN: CPT | Mod: S$GLB,,, | Performed by: OPHTHALMOLOGY

## 2024-12-05 PROCEDURE — 92250 FUNDUS PHOTOGRAPHY W/I&R: CPT | Mod: S$GLB,,, | Performed by: OPHTHALMOLOGY

## 2024-12-05 PROCEDURE — 99999 PR PBB SHADOW E&M-EST. PATIENT-LVL III: CPT | Mod: PBBFAC,,, | Performed by: OPHTHALMOLOGY

## 2024-12-05 PROCEDURE — 76514 ECHO EXAM OF EYE THICKNESS: CPT | Mod: S$GLB,,, | Performed by: OPHTHALMOLOGY

## 2024-12-05 RX ORDER — METHAZOLAMIDE 50 MG/1
50 TABLET ORAL 2 TIMES DAILY
COMMUNITY
End: 2024-12-05 | Stop reason: SDUPTHER

## 2024-12-05 RX ORDER — METHAZOLAMIDE 50 MG/1
50 TABLET ORAL 2 TIMES DAILY
Qty: 60 TABLET | Refills: 4 | Status: SHIPPED | OUTPATIENT
Start: 2024-12-05

## 2024-12-05 NOTE — TELEPHONE ENCOUNTER
Called and spoke with pt.  He will call the office back in 1 week if he does not hear from  PT. I offer to give him the number but he will wait and call  the office back.

## 2024-12-05 NOTE — Clinical Note
Patient sent in for IOP control - max drops, complex ocular Hx, had some sort of laser at [place] (possible MP3 / G-probe) : I will try to determine but considering YAG punture of Hydrus OD // OD, looks cocooned  --> any experience with this?

## 2024-12-05 NOTE — TELEPHONE ENCOUNTER
----- Message from Tanvir sent at 12/5/2024  2:15 PM CST -----  Regarding: Call requested for Nurse Shelby  Contact: [phone]  Hi,     Who called: The pt       Reason: Pt called to request a call form Nurse Shelby. Pls call the pt at  [phone]      Provider's name: Dr. Waldrop      Additional Information: Thank you.

## 2024-12-06 PROBLEM — H21.562 AFFERENT PUPILLARY DEFECT OF LEFT EYE: Status: ACTIVE | Noted: 2024-12-06

## 2024-12-06 PROBLEM — Z96.1 PSEUDOPHAKIA OF BOTH EYES: Status: ACTIVE | Noted: 2024-12-06

## 2024-12-06 PROBLEM — H40.1133 PRIMARY OPEN ANGLE GLAUCOMA (POAG) OF BOTH EYES, SEVERE STAGE: Status: ACTIVE | Noted: 2024-12-06

## 2024-12-06 LAB — VIT B1 BLD-MCNC: 103 UG/L (ref 38–122)

## 2024-12-06 NOTE — PROGRESS NOTES
"HPI     Glaucoma     Additional comments: Glaucoma Eval- Dr. Stanford           Comments    Pt states his va seems to be doing well in OU.    H/o Glaucoma Sx OU- Dr. Patricio([facility]) Early 2024.  Glaucoma Laser Sx OD- [facility] 8/2024    Diamox 500mg BID PO-"tears up his stomach"  Cosopt BID OU  Rocklatan QHS OU          Last edited by Jaxson Noel on 12/5/2024  8:50 AM.            Assessment /Plan     For exam results, see Encounter Report.    Primary open angle glaucoma (POAG) of both eyes, severe stage  -     Posterior Segment OCT Optic Nerve- Both eyes; Future  -     Cedillo Visual Field - OU - Extended - Both Eyes; Future  -     Color Fundus Photography - OU - Both Eyes    Pseudophakia of both eyes    Afferent pupillary defect of left eye        Dr Stanford x 30 years    Hydrus OU @ June 2024  [facility] for LASER --> ?? MP3 vs G-probe ??      Patient with sign other  Self employed : Financing // Loans      Complex Ocular Hx      Advanced POAG   + APD OS    + Steroids to Knees --> discussed steroid response and hold until further notice    CCT  541 // 505    Mid -low teens --> not achieved and discussed options  --> meds, GDD and Oliver-scleral LASER  --> Consider YAG puncture Hydrus    Both eyes --> adjusting  Cosopt TID --> consider BID 2/2 [place]  Rocklantan q day  Brimonidine --> start and discussed SE, use and expectations with Q & A & dizziness  Diamox 500 mg PO BID --> stop --> GiI intolerant // diarrhea / dehydration  Neptazine 50 PO BID --> start and discussed SE, use and expectations with Q & A    SP Hydrus OU  ?? Patent OU    PC IOL OU  Quiet  Observe    12/05/2024    Color better than contour              Plan  RTC 1-2 weeks IOP, Adherence --> patient to clarify LASER at [place]  --> consider YAG puncture Hydrus  RTC sooner prn with good understanding                     "

## 2024-12-13 ENCOUNTER — CLINICAL SUPPORT (OUTPATIENT)
Dept: REHABILITATION | Facility: HOSPITAL | Age: 69
End: 2024-12-13
Attending: STUDENT IN AN ORGANIZED HEALTH CARE EDUCATION/TRAINING PROGRAM
Payer: COMMERCIAL

## 2024-12-13 DIAGNOSIS — Z74.09 IMPAIRED FUNCTIONAL MOBILITY, BALANCE, GAIT, AND ENDURANCE: Primary | ICD-10-CM

## 2024-12-13 DIAGNOSIS — R26.9 ABNORMAL GAIT: ICD-10-CM

## 2024-12-13 PROCEDURE — 97162 PT EVAL MOD COMPLEX 30 MIN: CPT | Mod: PO

## 2024-12-13 PROCEDURE — 97110 THERAPEUTIC EXERCISES: CPT | Mod: PO

## 2024-12-13 NOTE — PLAN OF CARE
[facility]  Physical Therapy Neurological Rehabilitation Initial Evaluation     Name: Gualberto Brandon  [facility] Number: [id number]    Therapy Diagnosis:   Encounter Diagnoses   Name Primary?    Abnormal gait     Impaired functional mobility, balance, gait, and endurance Yes     Physician: Barbara Waldrop MD    Physician Orders: PT Eval and Treat   Medical Diagnosis from Referral: R26.9 (ICD-10-CM) - Abnormal gait   Evaluation Date: 12/13/2024  Authorization Period Expiration: 12/2/2024  Plan of Care Expiration: 2/7/205  Progress Note Due: 1/10/2025  Date of Surgery: NA2  Visit # / Visits authorized: 01/ 01  FOTO: 1/ 3    Precautions: Standard and Fall    Time In: 715  Time Out: 815  Total Billable Time: 45 minutes    Subjective      Date of onset: progressive over months     History of current condition - Gualberto reports: he is not able to maintain his balance when walking. The patient reports he is not able to stand on one leg and maintain his stability. He also reports he has noticed he has more trouble with turning than he has in the past. He reports turning corners he will cut the corners very close and bump the corner. He reports he has noticed that he shuffles his feet at times. He reports this began about 1.5 years ago. Has been having issues with his vision and he may be having surgery in the near future.       Chief Complaint / Interval History: Poor Balance  Prior Neuro Consult 5/2023 at [facility]  Patient with family history of dementia, who presents with a 6-month history of intermittent orthostatic complaints associated with a sense of increasing imbalance, which have improved over the past several weeks.  There are no associated cognitive complaints.  Structural imaging is reviewed and is notable for age-related atrophy with commensurate expansion of the lateral ventricles.  There are no features concerning for normal pressure hydrocephalus.  The cause of intermittent  orthostatic symptoms and imbalance remains unclear, although a neurodegenerative etiology is unlikely.  Rather, I suspect that use of cognitive/balance impairing medications (Ambien, clonazepam, Viibryd, Adderall) may all contribute to symptoms and to orthostatic complaints.  The patient does have a history of cervical fusion and lumbar diskectomy.  Cord compression provides an alternate explanation for bowel incontinence, which requires reassessment via neuroimaging.          Imaging: all imaging reviewed in [facility] prior to evaluation     Prior Therapy: prior PT for imbalance   Social History: lives with girlfriend 3-4 days a week    Falls: 4 falls in the past year   DME:  no DME    Home Environment: no MARY    Exercise Routine / History: none    Family Present at time of Eval: none   Occupation:  trying to retire, self employed  Prior Level of Function: independent with all functional mobility and ADLs  Current Level of Function: independent with all functional mobility and ADLs but requires increased time     Pain:  Current 0/10, worst 3/10, best 0/10   Location:  low back pain   Description: Aching and Dull  Aggravating Factors: movement   Easing Factors: rest, sitting     Patient's goals: improve balance and stability with moving around    Medical History:   No past medical history on file.    Surgical History:   Gualberto Brandon  has no past surgical history on file.    Medications:   Gualberto has a current medication list which includes the following prescription(s): acetazolamide, acetazolamide, acetazolamide, acetazolamide, amoxicillin, bupropion, bupropion, cephalexin, cephalexin, cephalexin, cephalexin, chlorhexidine, clonazepam, clonazepam, dextroamphetamine-amphetamine, dorzolamide-timolol 2-0.5%, dorzolamide-timolol 2-0.5%, dorzolamide-timolol 2-0.5%, dorzolamide-timolol 2-0.5%, dorzolamide-timolol 2-0.5%, jardiance, ibuprofen, ketorolac 0.5%, lamotrigine, losartan, mercaptopurine, mercaptopurine,  "mercaptopurine, methazolamide, metoprolol succinate, rocklatan, rocklatan, rocklatan, rocklatan, ofloxacin, plenvu, prednisolone acetate, prednisolone acetate, rosuvastatin, rosuvastatin, ozempic, ozempic, ozempic, ozempic, ozempic, tadalafil, tadalafil, topiramate, tramadol, trazodone, trazodone, vilazodone, zolpidem, zolpidem, and [DISCONTINUED] latanoprost.    Allergies:   Review of patient's allergies indicates:  No Known Allergies     Objective      Mental status: alert, oriented to person, place, and time  Appearance: Casually dressed  Behavior:  calm and cooperative  Attention Span and Concentration:  Normal    Dominant hand:  ambidextrous     Posture Alignment in sitting/ standing :   Head: forward head   Scapulae: slouched posture   Trunk: WNL   Pelvis: WNL   Legs: WNL     Sensation: Light Touch: Intact, no reports of numbness or tingling          Tone: 0 - No increase in muscle tone  Limbs/muscles affected: NA      Coordination:   - fine motor: intact  - UE coordination: intact    - LE coordination:  impaired rapid alternating toe taps, intact heel to shin     ROM:   UPPER EXTREMITY--AROM/PROM  (R) UE: WNLs  (L) UE: WNLs           RANGE OF MOTION--LOWER EXTREMITIES  (R) LE Hip: normal   Knee: normal   Ankle: normal    (L) LE: Hip: normal   Knee: normal   Ankle: normal    Strength: manual muscle test grades below     Lower Extremity Strength  Right LE  Left LE    Hip Flexion: 4+/5 Hip Flexion: 4+/5   Hip Extension:  4/5 Hip Extension: 4/5   Hip Abduction: 5/5 Hip Abduction: 5/5   Hip Adduction: 5/5 Hip Adduction 5/5   Knee Extension: 5/5 Knee Extension: 5/5   Knee Flexion: 4+/5 Knee Flexion: 5/5   Ankle Dorsiflexion: 5/5 Ankle Dorsiflexion: 5/5   Ankle Plantarflexion: 5/5 Ankle Plantarflexion: 5/5     Abdominal Strength: 3/5    Flexibility: WNL    MARLIN SENSORY TESTING:  (P= Pass, F= Fail; note any sway; hold each position for 30")  Condition 1: (firm surface/feet together/eyes open) P 30 seconds   Condition " 2: (firm surface/feet together/eyes closed) P 30 seconds   Condition 3: (firm surface/feet in tandem/eyes open) P BLE leading   Condition 4: (firm surface/feet in tandem/eyes closed) F 2 seconds with BLE leading   Condition 5: (soft surface/feet together/eyes open) P  Condition 6: (soft surface/feet together/eyes closed) P  Condition 7: (Fakuda step test), measure distance varied from center starting position NT        Evaluation   Single Limb Stance R LE 2 seconds   (<10 sec = HIGH FALL RISK)   Single Limb Stance L LE 3 seconds   (<10 sec = HIGH FALL RISK)      Evaluation   30 second Chair Rise  (adults > 59 y/o) 13 completed with no arms   5 times sit-stand  (adults 18-63 y/o) 12 seconds  >12 sec= fall risk for general elderly  >16 sec= fall risk for Parkinson's disease  >10 sec= balance/vestibular dysfunction (<59 y/o)  >14.2 sec= balance/vestibular dysfunction (>59 y/o)  >12 sec= fall risk for CVA       Gait Assessment:   - AD used: none   - Assistance: independent   - Distance: ambulatory for short distances throughout session     GAIT DEVIATIONS:  Gualberto displays the following deviations with ambulation: decreased BLE foot clearance and decreased step length     Impairments contributing to deviations: impaired motor control, decreased hip strength, impaired coordination, impaired balance     Endurance Deficit: moderate       Evaluation   Self Selected Walking Speed 1.2 m/sec (6m/5s)   Fast Walking Speed 1.4 m/sec (6m/4s)       Functional Gait Assessment:   1. Gait on level surface =  3  2. Change in Gait Speed = 3  3. Gait with horizontal head turns  = 2  4. Gait with vertical head turns = 3  5. Gait with pivot turns = 3  6. Step over obstacle = 3  7. Gait with Narrow YEISON = 2  8. Gait with eyes closed = 2  9. Ambulating Backwards = 3  10. Steps = 3     Score 27/30   Score:   <22/30 fall risk   <20/30 fall risk in older adults   <18/30 fall risk in Parkinsons        Intake Outcome Measure for FOTO Balance  Survey    Therapist reviewed FOTO scores for Gualberto Brandon on 12/13/2024.   FOTO report - see Media section or FOTO account episode details.    Intake Score: 57%       Treatment     Total Treatment time separate from Evaluation: 10  minutes    Gualberto received the treatments listed below:      therapeutic exercises to develop strength, endurance, ROM, flexibility, posture, and core stabilization for 10  minutes including:    Patient educated on and instructed to perform daily walking program:   Patient instructed to perform 30-45 minutes walking daily   Patient educated on the vestibular system and various conditions that can impact the patient's stability. The patient was also educated on polypharmacy and the impacts medications can have on balance and stability. The patient was encouraged to review his medications with his PCP to ensure no adverse drug interactions. Patient educated on the purpose of neuro PT.       Patient Education and Home Exercises     Education provided:   - POC, purpose of PT, discharge planning     Written Home Exercises Provided:Patient will be provided formal home exercise program at follow up session     Assessment     Gualberto is a 69 y.o. male referred to outpatient Physical Therapy with a medical diagnosis of  Abnormal gait . Patient presents with primary complaints of impaired stability when ambulating. The patient reports no dizziness recently. He reports two bouts of vertigo and room spinning sensation within the past year that has self resolved. Plan to assess CRT's at follow up session. Additionally, plan to assess orthostatics at follow up session.  Based on the patient's MMT scores, the patient presents with mildly impaired BLE hip strength with more significant strength deficits noted on the RLE. Hip flexion and hip extension most limited. Based on the patient's SSWS, the patient falls into the community ambulator category. The patient's FGA score places him outside of  the elevated falls risk category.   The patient's 5 time sit to stand score also places the patient in the elevated fall risk category and indicates impaired LE strength and endurance. Decreased eccentric control when sitting down noted. Based on the patient's performance on the GST, the patient has the most difficulty with vision eliminated with narrow base of support. The patient's SLS time places the patient in the elevated fall risk category. The patient will benefit from skilled physical therapy intervention to address the deficits listed above. Plan to focus on endurance, balance and LE strength.      Patient prognosis is Good.   Patient will benefit from skilled outpatient Physical Therapy to address the deficits stated above and in the chart below, provide patient /family education, and to maximize patient's level of independence.     Plan of care discussed with patient: Yes  Patient's spiritual, cultural and educational needs considered and patient is agreeable to the plan of care and goals as stated below:     Anticipated Barriers for therapy: co-morbidities, anxiety    Medical Necessity is demonstrated by the following  History  Co-morbidities and personal factors that may impact the plan of care [] LOW: no personal factors / co-morbidities  [] MODERATE: 1-2 personal factors / co-morbidities  [x] HIGH: 3+ personal factors / co-morbidities    Moderate / High Support Documentation:   Co-morbidities affecting plan of care: Glaucoma, anxiety, depression      Personal Factors:   coping style  lifestyle  character     Examination  Body Structures and Functions, activity limitations and participation restrictions that may impact the plan of care [] LOW: addressing 1-2 elements  [x] MODERATE: 3+ elements  [] HIGH: 4+ elements (please support below)    Moderate / High Support Documentation:   Multiple systems involved  Cognitive factors/ anxiety   Elevated risk for falls      Clinical Presentation [] LOW:  stable  [x] MODERATE: Evolving  [] HIGH: Unstable     Decision Making/ Complexity Score: moderate       Goals:  Short Term Goals: 4 weeks   1. Patient to be (I) with established home exercise program.  2. Patient to improve bilateral hip extension strength by 1/3 MMT to improve balance and functional mobility.   3. Patient to improve 5 time sit to stand time to 10 seconds for improved functional mobility and strength.   4. Patient to improve GST condition 4 to 10 seconds with BLE leading for improved stability with mobility.   5. Patient to improve SLS time to 5 seconds for decreased risk for falls with community ambulation        Long Term Goals: 8 weeks   1. Patient to be (I) with advanced home exercise program.  2. Patient to improve bilateral hip extension strength by 2/3 MMT to improve balance and functional mobility.   3. Patient to improve 5 time sit to stand time to 8 seconds for improved functional mobility and strength.   4. Patient to improve GST condition 4 to 20 seconds with BLE leading for improved stability with mobility.   5. Patient to improve SLS time to 10 seconds for decreased risk for falls with community ambulation       Plan     Plan of care Certification: 12/13/2024 to 2/7/205.    Outpatient Physical Therapy 2 times weekly for 8 weeks to include the following interventions: Gait Training, Manual Therapy, Neuromuscular Re-ed, Orthotic Management and Training, Patient Education, Self Care, Therapeutic Activities, and Therapeutic Exercise.     Rosi Cm PT        Physician's Signature: _________________________________________ Date: ________________

## 2024-12-19 ENCOUNTER — CLINICAL SUPPORT (OUTPATIENT)
Dept: OPHTHALMOLOGY | Facility: CLINIC | Age: 69
End: 2024-12-19
Payer: COMMERCIAL

## 2024-12-19 ENCOUNTER — OFFICE VISIT (OUTPATIENT)
Dept: OPHTHALMOLOGY | Facility: CLINIC | Age: 69
End: 2024-12-19
Payer: COMMERCIAL

## 2024-12-19 DIAGNOSIS — Z96.1 PSEUDOPHAKIA OF BOTH EYES: ICD-10-CM

## 2024-12-19 DIAGNOSIS — H40.1133 PRIMARY OPEN ANGLE GLAUCOMA (POAG) OF BOTH EYES, SEVERE STAGE: Primary | ICD-10-CM

## 2024-12-19 DIAGNOSIS — H21.562 AFFERENT PUPILLARY DEFECT OF LEFT EYE: ICD-10-CM

## 2024-12-19 PROCEDURE — 99999 PR PBB SHADOW E&M-EST. PATIENT-LVL III: CPT | Mod: PBBFAC,,, | Performed by: OPHTHALMOLOGY

## 2024-12-19 RX ORDER — BRIMONIDINE TARTRATE 2 MG/ML
1 SOLUTION/ DROPS OPHTHALMIC 2 TIMES DAILY
COMMUNITY

## 2024-12-20 NOTE — PROGRESS NOTES
HPI    Patient states that vision seems about the same as last visit in both   eyes.    H/o Glaucoma Sx OU- Dr. Patricio([facility]) Early 2024.  Glaucoma Laser Sx OD- [facility] 8/2024    Brimonidine BID OU  Cosopt BID OU  Rocklatan QHS OU  Neptazine 50mg BID PO  Last edited by Jaxson Noel on 12/19/2024  4:04 PM.            Assessment /Plan     For exam results, see Encounter Report.    Primary open angle glaucoma (POAG) of both eyes, severe stage  -     Yag Laser Eye Procedure - OD - Right Eye    Pseudophakia of both eyes    Afferent pupillary defect of left eye        Dr Stanford x 30 years    Hydrus OU @ June 2024  [facility] for LASER --> ?? MP3 vs G-probe ??      Patient with sign other  Self employed : Financing // Loans      Complex Ocular Hx      Advanced POAG   + APD OS    + Steroids to Knees --> discussed steroid response and hold until further notice    CCT  541 // 505    Mid -low teens --> not achieved and discussed options  --> meds, YAG puncture & GDD    Both eyes --> tolerating well & good adherence --> CSM  Cosopt TID --> consider BID 2/2 [place]  Rocklantan q day  Brimonidine  Diamox 500 mg PO BID --> Holding --> GiI intolerant // diarrhea / dehydration  Neptazine 50 PO BID --> push TID       SP Hydrus OU  ?? Patent OU      SP OD G-Probe CPC  Andrew Esparza at [place] 08/29/2024  Transscleral Diode Laser Cyclophotocoagulation (CPC) PROCEDURE:  Retrobulbar block performed  The G-probe was applied to the sclera with the footplate of the probe tipe directed away from the cornea with the laser directed over the ciliary processes between 1 to 2 mm posterior to the limbus.  Diode laser settings: 1250 mW power, 4 sec duration  Treatment of the ciliary processes was performed 360 degrees: 24 shots in 4 quadrants (3' and 9' oclock meridians spared)        PC IOL OU  Quiet  Observe    12/05/2024  Color better than contour                12/19/2024  OD YAG disruption // puncture of Hydrus     Glaucoma  Laser  Surgery Consent:  Patient with glaucoma and IOP control not at target for the health of the eye.  Discussed with patient options, risks and benefits, expectations of glaucoma laser surgery, utilized an eye model images with questions and answers to facilitate discussion.   The  patient voice good understanding and wishes to proceed with surgery.  The patient will likely benefit from laser surgery and patient  signed consent for Right Eye.     The correct eye was identified by patient prior to start of the procedure.    YAG Capsulotomy:    Total Energy:  320 / 600 mJ    Total # of Exposures: 150 // 160 Burst    Patient tolerated procedure well       Gualberto understands the information Dr. Bonilla provided at the time of visit.  The patient voices good understanding of these these instructions and agrees with the plan.      PF 1% 2x/day for 4-5 Days in the eye with laser treatment      Plan  RTC 1 weeks IOP, Adherence p YAG puncture Hydrus --> consider GDD OD discussed  RTC sooner prn with good understanding

## 2024-12-27 NOTE — PROGRESS NOTES
[facility]   Physical Therapy Treatment Note      Name: Gualberto Brandon  Clinic Number: [id number]    Therapy Diagnosis:   Encounter Diagnosis   Name Primary?    Impaired functional mobility, balance, gait, and endurance Yes     Physician: Barbara Waldrop MD    Visit Date: 12/30/2024    Physician Orders: PT Eval and Treat   Medical Diagnosis from Referral: R26.9 (ICD-10-CM) - Abnormal gait   Evaluation Date: 12/13/2024  Authorization Period Expiration: 12/2/2024  Plan of Care Expiration: 2/7/205  Progress Note Due: 1/10/2025  Date of Surgery: NA2  Visit # / Visits authorized: 01/ 01  FOTO: 1/ 3     Precautions: Standard and Fall     Time In: 08:00  Time Out: 08:45  Total Billable Time: 45 minutes    PTA Visit #: 0/5       Subjective     Patient reports: He is doing ok today.  HEP not given last session  Response to previous treatment: eval only  Functional change: on going    Pain: 0/10  Location: denies pain     Objective      Objective Measures updated at progress report unless specified.     Treatment     Gualberto received the treatments listed below:      therapeutic exercises to develop strength, endurance, and ROM for 10 minutes including:  Supine:  X 10 bridge  X 10 bridge with ball squeeze    Scifit level 4 x 8 minutes    neuromuscular re-education activities to improve: Balance, Coordination, and Proprioception for 23 minutes. The following activities were included:  Supine:  Abd brace with isometric upper extremity and lower extremity 5 sec hold x 10 each side    At ballet bar: all with touchdown support and CGA  X 10 step up onto foam fitter with opposite high knee, 1 loss of balance requires min assist to correct  X 12 standing on foam fitter, alternating lower extremity cone tap    2 X 30 sec split stance on foam fitter  X 30 sec split stance on foam fitter, head rotations  X 30 sec split stance on foam fitter, head nods  2 X 30 sec split stance on foam fitter, eyes  closed    On foam:  X 30 sec feet together stabilization  2 X 30 sec feet together with head rotation  2 X 30 sec feet together with head nods  2 X 30 sec feet together with eyes closed       therapeutic activities to improve functional performance for 12  minutes, including:  X 10 sit to stand from black mat     All with CGA:  100 feet x 2 walking with head nods  100 feet x 2 walking with head turns  50 feet x 2 narrowed base of support walk  50 feet x 2 backward walk         Patient Education and Home Exercises       Education provided:   - benefits of todays interventions, HEP    Written Home Exercises Provided: Yes. Exercises were reviewed and Gualberto was able to demonstrate them prior to the end of the session.  Gualberto demonstrated good  understanding of the education provided. See Electronic Medical Record under Patient Instructions for exercises provided during therapy sessions    Assessment     Mr. Brandon participated well in first follow up session.  He requires upper extremity assist for standing balance activity secondary to loss of balance.  Also with CGA for dynamic balance, and 1 episode of loss of balance requires min assist to correct.  He was given initial HEP focusing on core strengthening and stability, verbalizes understanding.  He remains appropriate for skilled Physical Therapy.     Gualberto Is progressing well towards his goals.   Patient prognosis is Good.     Patient will continue to benefit from skilled outpatient physical therapy to address the deficits listed in the problem list box on initial evaluation, provide pt/family education and to maximize pt's level of independence in the home and community environment.     Patient's spiritual, cultural and educational needs considered and pt agreeable to plan of care and goals.     Anticipated barriers to physical therapy: co-morbidities, anxiety     Goals: Goals:  Short Term Goals: 4 weeks   1. Patient to be (I) with established home  exercise program.  2. Patient to improve bilateral hip extension strength by 1/3 MMT to improve balance and functional mobility.   3. Patient to improve 5 time sit to stand time to 10 seconds for improved functional mobility and strength.   4. Patient to improve GST condition 4 to 10 seconds with BLE leading for improved stability with mobility.   5. Patient to improve SLS time to 5 seconds for decreased risk for falls with community ambulation         Long Term Goals: 8 weeks   1. Patient to be (I) with advanced home exercise program.  2. Patient to improve bilateral hip extension strength by 2/3 MMT to improve balance and functional mobility.   3. Patient to improve 5 time sit to stand time to 8 seconds for improved functional mobility and strength.   4. Patient to improve GST condition 4 to 20 seconds with BLE leading for improved stability with mobility.   5. Patient to improve SLS time to 10 seconds for decreased risk for falls with community ambulation     Plan     Cont to progress functional mobility and gait     Sydnie Birmingham, PT

## 2024-12-30 ENCOUNTER — CLINICAL SUPPORT (OUTPATIENT)
Dept: REHABILITATION | Facility: HOSPITAL | Age: 69
End: 2024-12-30
Payer: COMMERCIAL

## 2024-12-30 DIAGNOSIS — Z74.09 IMPAIRED FUNCTIONAL MOBILITY, BALANCE, GAIT, AND ENDURANCE: Primary | ICD-10-CM

## 2024-12-30 PROCEDURE — 97112 NEUROMUSCULAR REEDUCATION: CPT | Mod: PO

## 2024-12-30 PROCEDURE — 97530 THERAPEUTIC ACTIVITIES: CPT | Mod: PO

## 2024-12-31 ENCOUNTER — OFFICE VISIT (OUTPATIENT)
Dept: OPHTHALMOLOGY | Facility: CLINIC | Age: 69
End: 2024-12-31
Payer: COMMERCIAL

## 2024-12-31 DIAGNOSIS — H40.1133 PRIMARY OPEN ANGLE GLAUCOMA (POAG) OF BOTH EYES, SEVERE STAGE: Primary | ICD-10-CM

## 2024-12-31 DIAGNOSIS — Z96.1 PSEUDOPHAKIA OF BOTH EYES: ICD-10-CM

## 2024-12-31 DIAGNOSIS — H21.561 AFFERENT PUPILLARY DEFECT OF RIGHT EYE: ICD-10-CM

## 2024-12-31 DIAGNOSIS — H40.1194 PRIMARY OPEN-ANGLE GLAUCOMA, INDETERMINATE STAGE, UNSPECIFIED LATERALITY: ICD-10-CM

## 2024-12-31 PROCEDURE — 99999 PR PBB SHADOW E&M-EST. PATIENT-LVL II: CPT | Mod: PBBFAC,,, | Performed by: STUDENT IN AN ORGANIZED HEALTH CARE EDUCATION/TRAINING PROGRAM

## 2024-12-31 RX ORDER — BRIMONIDINE TARTRATE 2 MG/ML
1 SOLUTION/ DROPS OPHTHALMIC 2 TIMES DAILY
Qty: 10 ML | Refills: 11 | Status: SHIPPED | OUTPATIENT
Start: 2024-12-31

## 2024-12-31 RX ORDER — METHAZOLAMIDE 50 MG/1
50 TABLET ORAL 2 TIMES DAILY
Qty: 60 TABLET | Refills: 4 | Status: SHIPPED | OUTPATIENT
Start: 2024-12-31

## 2024-12-31 NOTE — PROGRESS NOTES
HPI    Last eye exam was 12/19/24 with Dr. Bonilla.    Patient here for 2 wk IOP ck.    H/o Glaucoma Sx OU- Dr. Patricio([facility]) Early 2024.   Glaucoma Laser Sx OD- [facility] 8/2024   S/p yag puncture hydrus OD 12/19/24  S/p phaco OU  APD OS    Brimonidine BID OU   Cosopt BID OU   Rocklatan QHS OU   Neptazine 50mg TID PO     Last edited by Augusta Lake MA on 12/31/2024  8:52 AM.            Assessment /Plan     For exam results, see Encounter Report.    Primary open angle glaucoma (POAG) of both eyes, severe stage    Afferent pupillary defect of right eye    Pseudophakia of both eyes        Dr Stanford x 30 years    Patient with sig other  Self employed : Financing // Loans    Advanced POAG   + APD OD    + Steroids to Knees --> discussed steroid response and hold until further notice    CCT  541 // 505    Mid -low teens --> not achieved and discussed options  --> meds & GDD    Both eyes --> tolerating well & good adherence --> CSM  Cosopt TID --> consider BID 2/2 [place]  Rocklatan q day  Brimonidine  Diamox 500 mg PO BID --> Holding  --> GI intolerant // diarrhea / dehydration --> improved  Neptazine 50 PO TID     Hydrus OU @ June 2024  ?? Patent OU    [facility] for LASER --> G-probe Dr Esparza @08/2024 12/19/2024  Pre-Tx 37  OD YAG disruption // puncture of Hydrus --> possible response 12/31/2024 --> 29      SP OD G-Probe CPC  Andrew Esparza at [place] 08/29/2024  Transscleral Diode Laser Cyclophotocoagulation (CPC) PROCEDURE:  Retrobulbar block performed  The G-probe was applied to the sclera with the footplate of the probe tipe directed away from the cornea with the laser directed over the ciliary processes between 1 to 2 mm posterior to the limbus.  Diode laser settings: 1250 mW power, 4 sec duration  Treatment of the ciliary processes was performed 360 degrees: 24 shots in 4 quadrants (3' and 9' oclock meridians spared)        PC IOL OU  Quiet  Observe    12/05/2024  Color better than  contour              Patient with wife  Discussed options & used eye model with Ahmed &  R & B sx c Q & A  Further discussion with Dr Etienne 12/31/2024 12/31/2024  Discussion with patient and wife about surgical options (Ahmed vs Baerveldt)  Recommend  OD asap  Continue all other meds as above    Today's visit is associated with current and anticipated ongoing medical care related to this patient's single serious/complex condition (glaucoma). Follow up is to be continued indefinitely to monitor the condition.    I discussed the planned drainage device implant with Mr. Brandon. We discussed the rationale for the procedure and the risks involved.  I explained that surgery always carries risk, including but not limited to bleeding, infection, double vision, tube exposure, hypotony, corneal edema, ptosis (droopy eyelid), and vision loss. I noted that other complications may occur. I explained that whenever a person has incisional surgery on the eye, three bad things may happen: the eye may not see, the eye may hurt, and the eye may need another operation.  I also explained that anesthetics, including local anesthetics, carry a small risk of a severe or even fatal complication. I did my best to answer the his questions. Mr. Brandon  gave verbal and written consent to proceed with the surgery.    OR TBD --  OD  Needs: Baerveldt 350 x2, CorneaGen x2    Johana Etienne MD

## 2024-12-31 NOTE — H&P (VIEW-ONLY)
HPI    Last eye exam was 12/19/24 with Dr. Bonilla.    Patient here for 2 wk IOP ck.    H/o Glaucoma Sx OU- Dr. Patricio([facility]) Early 2024.   Glaucoma Laser Sx OD- [facility] 8/2024   S/p yag puncture hydrus OD 12/19/24  S/p phaco OU  APD OS    Brimonidine BID OU   Cosopt BID OU   Rocklatan QHS OU   Neptazine 50mg TID PO     Last edited by Augusta Lake MA on 12/31/2024  8:52 AM.            Assessment /Plan     For exam results, see Encounter Report.    Primary open angle glaucoma (POAG) of both eyes, severe stage    Afferent pupillary defect of right eye    Pseudophakia of both eyes        Dr Stanford x 30 years    Patient with sig other  Self employed : Financing // Loans    Advanced POAG   + APD OD    + Steroids to Knees --> discussed steroid response and hold until further notice    CCT  541 // 505    Mid -low teens --> not achieved and discussed options  --> meds & GDD    Both eyes --> tolerating well & good adherence --> CSM  Cosopt TID --> consider BID 2/2 [place]  Rocklatan q day  Brimonidine  Diamox 500 mg PO BID --> Holding  --> GI intolerant // diarrhea / dehydration --> improved  Neptazine 50 PO TID     Hydrus OU @ June 2024  ?? Patent OU    [facility] for LASER --> G-probe Dr Esparza @08/2024 12/19/2024  Pre-Tx 37  OD YAG disruption // puncture of Hydrus --> possible response 12/31/2024 --> 29      SP OD G-Probe CPC  Andrew Esparza at [place] 08/29/2024  Transscleral Diode Laser Cyclophotocoagulation (CPC) PROCEDURE:  Retrobulbar block performed  The G-probe was applied to the sclera with the footplate of the probe tipe directed away from the cornea with the laser directed over the ciliary processes between 1 to 2 mm posterior to the limbus.  Diode laser settings: 1250 mW power, 4 sec duration  Treatment of the ciliary processes was performed 360 degrees: 24 shots in 4 quadrants (3' and 9' oclock meridians spared)        PC IOL OU  Quiet  Observe    12/05/2024  Color better than  contour              Patient with wife  Discussed options & used eye model with Ahmed &  R & B sx c Q & A  Further discussion with Dr Etienne 12/31/2024 12/31/2024  Discussion with patient and wife about surgical options (Ahmed vs Baerveldt)  Recommend  OD asap  Continue all other meds as above    Today's visit is associated with current and anticipated ongoing medical care related to this patient's single serious/complex condition (glaucoma). Follow up is to be continued indefinitely to monitor the condition.    I discussed the planned drainage device implant with Mr. Brandon. We discussed the rationale for the procedure and the risks involved.  I explained that surgery always carries risk, including but not limited to bleeding, infection, double vision, tube exposure, hypotony, corneal edema, ptosis (droopy eyelid), and vision loss. I noted that other complications may occur. I explained that whenever a person has incisional surgery on the eye, three bad things may happen: the eye may not see, the eye may hurt, and the eye may need another operation.  I also explained that anesthetics, including local anesthetics, carry a small risk of a severe or even fatal complication. I did my best to answer the his questions. Mr. Brandon  gave verbal and written consent to proceed with the surgery.    OR TBD --  OD  Needs: Baerveldt 350 x2, CorneaGen x2    Johana Etienne MD

## 2025-01-03 ENCOUNTER — TELEPHONE (OUTPATIENT)
Dept: OPHTHALMOLOGY | Facility: CLINIC | Age: 70
End: 2025-01-03
Payer: COMMERCIAL

## 2025-01-03 DIAGNOSIS — H40.1133 PRIMARY OPEN ANGLE GLAUCOMA (POAG) OF BOTH EYES, SEVERE STAGE: Primary | ICD-10-CM

## 2025-01-08 ENCOUNTER — CLINICAL SUPPORT (OUTPATIENT)
Dept: REHABILITATION | Facility: HOSPITAL | Age: 70
End: 2025-01-08
Payer: COMMERCIAL

## 2025-01-08 DIAGNOSIS — Z74.09 IMPAIRED FUNCTIONAL MOBILITY, BALANCE, GAIT, AND ENDURANCE: Primary | ICD-10-CM

## 2025-01-08 PROCEDURE — 97530 THERAPEUTIC ACTIVITIES: CPT | Mod: PO

## 2025-01-08 PROCEDURE — 97112 NEUROMUSCULAR REEDUCATION: CPT | Mod: PO

## 2025-01-08 NOTE — PROGRESS NOTES
[facility]   Physical Therapy Treatment Note      Name: Gualberto Brandon  Clinic Number: [id number]    Therapy Diagnosis:   Encounter Diagnosis   Name Primary?    Impaired functional mobility, balance, gait, and endurance Yes       Physician: Barbara Waldrop MD    Visit Date: 1/8/2025    Physician Orders: PT Eval and Treat   Medical Diagnosis from Referral: R26.9 (ICD-10-CM) - Abnormal gait   Evaluation Date: 12/13/2024  Authorization Period Expiration: 12/2/2024  Plan of Care Expiration: 2/7/205  Progress Note Due: 1/10/2025  Date of Surgery: NA2  Visit # / Visits authorized: 01/ 20  FOTO: 1/ 3     Precautions: Standard and Fall     Time In: 11:15  Time Out: 12:00  Total Billable Time: 45 minutes    PTA Visit #: 0/5       Subjective     Patient reports: He has been compliant with HEP.  He has been walking about 30-45 minutes every other day.    He was compliant with HEP   Response to previous treatment:  felt ok   Functional change: on going    Pain: 0/10  Location: denies pain     Objective      Objective Measures updated at progress report unless specified.     Treatment     Gualberto received the treatments listed below:      therapeutic exercises to develop strength, endurance, and ROM for 10 minutes including:  Supine:  X 10 bridge    X 30 sec bilateral calf stretch on incline  X 30 sec reach to toes stretch     Leg Press:  80lb B lower extremity x 15   100 lb B lower extremity press x 15     neuromuscular re-education activities to improve: Balance, Coordination, and Proprioception for 25 minutes. The following activities were included:  Supine:  Abd brace with isometric upper extremity and lower extremity 5 sec hold x 4 each side    At ballet bar: all with touchdown support and CGA  2 X 10 step tap onto foam fitter, no upper extremity, alternating sides, occ loss of balance requires min assist to correct  X 10 step up onto foam fitter with opposite high knee, 1 loss of balance  requires min assist to correct  2 X 12 standing on foam fitter, alternating lower extremity cone tap    2 X 30 sec split stance on foam fitter, head rotations  2 X 30 sec split stance on foam fitter, head nods  2 X 30 sec split stance on foam fitter, eyes closed    On foam:  X 30 sec feet together stabilization  2 X 30 sec feet together with head rotation  2 X 30 sec feet together with head nods  2 X 30 sec feet together with eyes closed       therapeutic activities to improve functional performance for 10  minutes, including:      All with CGA:  100 feet x 2 walking with head nods  100 feet x 2 walking with head turns  50 feet x 2 narrowed base of support walk  50 feet x 2 backward walk         Patient Education and Home Exercises       Education provided:   - benefits of todays interventions, HEP    Written Home Exercises Provided: Yes. Exercises were reviewed and Gualberto was able to demonstrate them prior to the end of the session.  Gualberto demonstrated good  understanding of the education provided. See Electronic Medical Record under Patient Instructions for exercises provided during therapy sessions    Assessment     Mr. Brandon participated fairly well during session.   Discussed equipment he can use in his home gym for strengthening activity.  He has some loss of balance with alternating lower extremity step taps and foam fitter step ups, requires min assist and able to self correct at ballet bar.  Discussed how to safely perform step taps onto book at home, needs to have support near by for safety, he verbalizes understanding. He remains appropriate for skilled Physical Therapy.     Gualberto Is progressing well towards his goals.   Patient prognosis is Good.     Patient will continue to benefit from skilled outpatient physical therapy to address the deficits listed in the problem list box on initial evaluation, provide pt/family education and to maximize pt's level of independence in the home and community  environment.     Patient's spiritual, cultural and educational needs considered and pt agreeable to plan of care and goals.     Anticipated barriers to physical therapy: co-morbidities, anxiety     Goals: Goals:  Short Term Goals: 4 weeks   1. Patient to be (I) with established home exercise program.  2. Patient to improve bilateral hip extension strength by 1/3 MMT to improve balance and functional mobility.   3. Patient to improve 5 time sit to stand time to 10 seconds for improved functional mobility and strength.   4. Patient to improve GST condition 4 to 10 seconds with BLE leading for improved stability with mobility.   5. Patient to improve SLS time to 5 seconds for decreased risk for falls with community ambulation         Long Term Goals: 8 weeks   1. Patient to be (I) with advanced home exercise program.  2. Patient to improve bilateral hip extension strength by 2/3 MMT to improve balance and functional mobility.   3. Patient to improve 5 time sit to stand time to 8 seconds for improved functional mobility and strength.   4. Patient to improve GST condition 4 to 20 seconds with BLE leading for improved stability with mobility.   5. Patient to improve SLS time to 10 seconds for decreased risk for falls with community ambulation     Plan     Cont to progress functional mobility and gait     Sydnie Birmingham, PT

## 2025-01-09 NOTE — PROGRESS NOTES
[facility]   Physical Therapy Treatment Note      Name: Gualberto Brandon  Clinic Number: [id number]    Therapy Diagnosis:   Encounter Diagnosis   Name Primary?    Impaired functional mobility, balance, gait, and endurance Yes         Physician: Barbara Waldrop MD    Visit Date: 1/10/2025    Physician Orders: PT Eval and Treat   Medical Diagnosis from Referral: R26.9 (ICD-10-CM) - Abnormal gait   Evaluation Date: 12/13/2024  Authorization Period Expiration: 12/2/2024  Plan of Care Expiration: 2/7/205  Progress Note Due: 1/10/2025  Date of Surgery: NA2  Visit # / Visits authorized: 02/ 20  FOTO: 1/ 3     Precautions: Standard and Fall     Time In: 0800  Time Out: 0900  Total Billable Time: 60 minutes    PTA Visit #: 0/5       Subjective     Patient reports: feels like his balance is continuously getting worse and declining, does not know what is happening and why2    He was compliant with HEP   Response to previous treatment:  felt ok   Functional change: on going    Pain: 0/10  Location: denies pain     Objective      Objective Measures updated at progress report unless specified.     Treatment     Gualberto received the treatments listed below:      therapeutic exercises to develop strength, endurance, and ROM for 10 minutes including:    10 minutes SCIFIT seated elliptical for CV endurance and LE strength, level 6.0      neuromuscular re-education activities to improve: Balance, Coordination, and Proprioception for 15 minutes. The following activities were included:      2 x 10 reps BLE leading single large cone tapping, CGA    2 x 30 seconds BLE leading split stance on large cone with eyes open, CGA, occ touchdown support    4 laps marching with three second holds, CGA  4 laps tandem ambulation, CGA       therapeutic activities to improve functional performance for 35  minutes, including:      All with CGA:  100 feet x 2 walking with head nods  100 feet x 2 walking with head  turns    10 reps BLE leading forward step up to foam fitter, CGA    Time above includes time to discuss multifactorial aspects contributing the the patient's increased instability when moving around. Some of these components include the following:   Polypharmacy   Nutritional deficiency (patient taking diet suppressing medications)   Visual impairments (glaucoma)   Severe anxiety   Potential inner ear dysfunction          Patient Education and Home Exercises       Education provided:   - benefits of todays interventions, HEP    Written Home Exercises Provided: Yes. Exercises were reviewed and Gualberto was able to demonstrate them prior to the end of the session.  Gualberto demonstrated good  understanding of the education provided. See Electronic Medical Record under Patient Instructions for exercises provided during therapy sessions    Assessment     Mr. Brandon participated fairly well during session.   The patient continues to reporting increased instability with movement and reports feeling very anxious about this. Therapist has extensive discussion with the patient of likely multifactorial origin of instability. The patient was encouraged to discuss medications with his PCP that he will be seeing later this morning. The patient verbalizes understanding. He remains appropriate for skilled Physical Therapy to address remaining functional mobility deficits.      Gualberto Is progressing well towards his goals.   Patient prognosis is Good.     Patient will continue to benefit from skilled outpatient physical therapy to address the deficits listed in the problem list box on initial evaluation, provide pt/family education and to maximize pt's level of independence in the home and community environment.     Patient's spiritual, cultural and educational needs considered and pt agreeable to plan of care and goals.     Anticipated barriers to physical therapy: co-morbidities, anxiety     Goals: Goals:  Short Term Goals: 4  weeks   1. Patient to be (I) with established home exercise program. Ongoing  2. Patient to improve bilateral hip extension strength by 1/3 MMT to improve balance and functional mobility. Ongoing  3. Patient to improve 5 time sit to stand time to 10 seconds for improved functional mobility and strength. Ongoing  4. Patient to improve GST condition 4 to 10 seconds with BLE leading for improved stability with mobility. Ongoing  5. Patient to improve SLS time to 5 seconds for decreased risk for falls with community ambulation Ongoing        Long Term Goals: 8 weeks   1. Patient to be (I) with advanced home exercise program.Ongoing  2. Patient to improve bilateral hip extension strength by 2/3 MMT to improve balance and functional mobility. Ongoing  3. Patient to improve 5 time sit to stand time to 8 seconds for improved functional mobility and strength. Ongoing  4. Patient to improve GST condition 4 to 20 seconds with BLE leading for improved stability with mobility. Ongoing  5. Patient to improve SLS time to 10 seconds for decreased risk for falls with community ambulation Ongoing    Plan     Cont to progress functional mobility and gait     Rosi Cm, PT

## 2025-01-10 ENCOUNTER — CLINICAL SUPPORT (OUTPATIENT)
Dept: REHABILITATION | Facility: HOSPITAL | Age: 70
End: 2025-01-10
Payer: COMMERCIAL

## 2025-01-10 DIAGNOSIS — Z74.09 IMPAIRED FUNCTIONAL MOBILITY, BALANCE, GAIT, AND ENDURANCE: Primary | ICD-10-CM

## 2025-01-10 PROCEDURE — 97112 NEUROMUSCULAR REEDUCATION: CPT | Mod: PO

## 2025-01-10 PROCEDURE — 97530 THERAPEUTIC ACTIVITIES: CPT | Mod: PO

## 2025-01-10 PROCEDURE — 97110 THERAPEUTIC EXERCISES: CPT | Mod: PO

## 2025-01-11 ENCOUNTER — PATIENT MESSAGE (OUTPATIENT)
Dept: OTOLARYNGOLOGY | Facility: CLINIC | Age: 70
End: 2025-01-11
Payer: COMMERCIAL

## 2025-01-13 ENCOUNTER — OFFICE VISIT (OUTPATIENT)
Dept: OTOLARYNGOLOGY | Facility: CLINIC | Age: 70
End: 2025-01-13
Payer: COMMERCIAL

## 2025-01-13 ENCOUNTER — CLINICAL SUPPORT (OUTPATIENT)
Dept: AUDIOLOGY | Facility: CLINIC | Age: 70
End: 2025-01-13
Payer: COMMERCIAL

## 2025-01-13 DIAGNOSIS — R26.89 IMBALANCE: Primary | ICD-10-CM

## 2025-01-13 DIAGNOSIS — H81.4 VERTIGO OF CENTRAL ORIGIN: Primary | ICD-10-CM

## 2025-01-13 PROCEDURE — 99214 OFFICE O/P EST MOD 30 MIN: CPT | Mod: S$GLB,,, | Performed by: OTOLARYNGOLOGY

## 2025-01-13 PROCEDURE — 92540 BASIC VESTIBULAR EVALUATION: CPT | Mod: S$GLB,,,

## 2025-01-13 PROCEDURE — 92537 CALORIC VSTBLR TEST W/REC: CPT | Mod: S$GLB,,,

## 2025-01-13 PROCEDURE — 99999 PR PBB SHADOW E&M-EST. PATIENT-LVL III: CPT | Mod: PBBFAC,,, | Performed by: OTOLARYNGOLOGY

## 2025-01-13 NOTE — PROGRESS NOTES
"[facility]   Physical Therapy Treatment and Reassessment Note      Name: Gualberto Brandon  Clinic Number: [id number]    Therapy Diagnosis:   Encounter Diagnosis   Name Primary?    Impaired functional mobility, balance, gait, and endurance Yes           Physician: Barbara Waldrop MD    Visit Date: 1/14/2025    Physician Orders: PT Eval and Treat   Medical Diagnosis from Referral: R26.9 (ICD-10-CM) - Abnormal gait   Evaluation Date: 12/13/2024  Authorization Period Expiration: 12/2/2024  Plan of Care Expiration: 2/7/205  Progress Note Due: 2/7/2025  Date of Surgery: NA2  Visit # / Visits authorized: 03/ 20  FOTO: 1/ 3     Precautions: Standard and Fall     Time In: 0800  Time Out: 0845  Total Billable Time: 45 minutes    PTA Visit #: 0/5       Subjective     Patient reports: no recent changes since previous therapy session     He was compliant with HEP   Response to previous treatment:  felt ok   Functional change: on going    Pain: 0/10  Location: denies pain     Objective      Objective Measures updated at progress report unless specified.     Lower Extremity Strength  Right LE  Eval  1/14/2025 Left LE  Eval  1/14/2025   Hip Flexion: 4+/5 4+/5 Hip Flexion: 4+/5 4+/5   Hip Extension:  4/5 5/5 Hip Extension: 4/5 5/5   Hip Abduction: 5/5 5/5 Hip Abduction: 5/5 5/5   Hip Adduction: 5/5 5/5 Hip Adduction 5/5 5/5   Knee Extension: 5/5 5/5 Knee Extension: 5/5 5/55   Knee Flexion: 4+/5 5/5 Knee Flexion: 5/5 5/5   Ankle Dorsiflexion: 5/5 5/5 Ankle Dorsiflexion: 5/5 5/5   Ankle Plantarflexion: 5/5 5/5 Ankle Plantarflexion: 5/5 5/5      Abdominal Strength: 3/5     Flexibility: WNL     MARLIN SENSORY TESTING:  (P= Pass, F= Fail; note any sway; hold each position for 30")  Condition 1: (firm surface/feet together/eyes open) P 30 seconds   Condition 2: (firm surface/feet together/eyes closed) P 30 seconds   Condition 3: (firm surface/feet in tandem/eyes open) P BLE leading   Condition 4: (firm " surface/feet in tandem/eyes closed) F RLE leading 3 seconds, LLE leading 4 seconds   Condition 5: (soft surface/feet together/eyes open) P  Condition 6: (soft surface/feet together/eyes closed) P  Condition 7: (Fakuda step test), measure distance varied from center starting position NT           Evaluation 1/14/2025   Single Limb Stance R LE 2 seconds   (<10 sec = HIGH FALL RISK) 26 seconds   Single Limb Stance L LE 3 seconds   (<10 sec = HIGH FALL RISK) 5 seconds         Evaluation 1/14/2025   30 second Chair Rise  (adults > 59 y/o) 13 completed with no arms 16 completed with no arms    5 times sit-stand  (adults 18-65 y/o) 12 seconds  >12 sec= fall risk for general elderly  >16 sec= fall risk for Parkinson's disease  >10 sec= balance/vestibular dysfunction (<59 y/o)  >14.2 sec= balance/vestibular dysfunction (>59 y/o)  >12 sec= fall risk for CVA 9 seconds               Intake Outcome Measure for FOTO Balance Survey     Therapist reviewed FOTO scores for Gualberto Brandon on 12/13/2024.   FOTO report - see Media section or FOTO account episode details.     Intake Score: 57%           Treatment     Gualberto received the treatments listed below:      therapeutic exercises to develop strength, endurance, and ROM for 10  minutes including:    10 minutes SCIFIT seated elliptical for CV endurance and LE strength, level 6.0      neuromuscular re-education activities to improve: Balance, Coordination, and Proprioception for 0  minutes. The following activities were included:    therapeutic activities to improve functional performance for 35 minutes, including:    In hallway while holding tidal tank:   2 x 100 feet ambulation, CGA   2 x 100 feet ambulation with horizontal head turns, CGA   2 x 100 feet ambulation with vertical head turns, CGA       Time above includes time to provide and review advanced home exercise program     Time above includes time to discuss and perform objective measures     Patient Education and  Home Exercises       Education provided:   - benefits of todays interventions, HEP    Written Home Exercises Provided: Yes. Exercises were reviewed and Gualberto was able to demonstrate them prior to the end of the session.  Gualberto demonstrated good  understanding of the education provided. See Electronic Medical Record under Patient Instructions for exercises provided during therapy sessions    Assessment     Mr. Brandon tolerated today's session fairly well. Patient remains limited by anxiety about perceived functional status despite therapist explaining that patient did well with all objective scores. The patient was again educated on the three systems that are used for balance (vision, proprioception and vestibular) and how his impairments with his vision and central vestibular system can impact his stability as well as how therapy interventions can assist with this. Improvements noted with his BLE hip extension strength scores. The patient remains in the elevated falls risk category based on his SLS time. The patient's 30 second chair rise score and 5 time sit to stand places him outside of the elevated falls risk category. . He remains appropriate for skilled Physical Therapy to address remaining functional mobility deficits.      Gualberto Is progressing well towards his goals.   Patient prognosis is Good.     Patient will continue to benefit from skilled outpatient physical therapy to address the deficits listed in the problem list box on initial evaluation, provide pt/family education and to maximize pt's level of independence in the home and community environment.     Patient's spiritual, cultural and educational needs considered and pt agreeable to plan of care and goals.     Anticipated barriers to physical therapy: co-morbidities, anxiety     Goals: Goals:  Short Term Goals: 4 weeks   1. Patient to be (I) with established home exercise program. MET 1/14/2025  2. Patient to improve bilateral hip extension  strength by 1/3 MMT to improve balance and functional mobility.  MET 1/14/2025  3. Patient to improve 5 time sit to stand time to 10 seconds for improved functional mobility and strength.  MET 1/14/2025  4. Patient to improve GST condition 4 to 10 seconds with BLE leading for improved stability with mobility. Ongoing  5. Patient to improve SLS time to 5 seconds for decreased risk for falls with community ambulation Ongoing        Long Term Goals: 8 weeks   1. Patient to be (I) with advanced home exercise program.Ongoing  2. Patient to improve bilateral hip extension strength by 2/3 MMT to improve balance and functional mobility. Ongoing  3. Patient to improve 5 time sit to stand time to 8 seconds for improved functional mobility and strength. Ongoing  4. Patient to improve GST condition 4 to 20 seconds with BLE leading for improved stability with mobility. Ongoing  5. Patient to improve SLS time to 10 seconds for decreased risk for falls with community ambulation Ongoing    Plan     Cont to progress functional mobility and gait     Rosi Cm, PT

## 2025-01-13 NOTE — PROGRESS NOTES
Subjective     Patient ID: Gualberto Brandon is a 69 y.o. male.    Chief Complaint: Balance issues.    Ref Dr Waldrop/ neurology    Hx of 6-12 mos of imbalance while walking/ turning.    Some gait issues/ falls.     Has OA/ tremor.  NPH W/U neg.    No ear sx's. No BPPV sx.    Two episodes of sub vertigo in past.    VNG today abnl/ central    Past Medical History: Patient has no past medical history on file.    Past Surgical History: Patient has no past surgical history on file.    Social History: Patient     Family History: family history is not on file.    Medications:   Current Outpatient Medications   Medication Sig    acetaZOLAMIDE (DIAMOX) 250 MG tablet Take 1 tablet (250 mg total) by mouth 4 (four) times daily as directed.    acetaZOLAMIDE (DIAMOX) 250 MG tablet Take 1 tablet (250 mg total) by mouth 4 (four) times daily as directed.    acetaZOLAMIDE (DIAMOX) 250 MG tablet Take 1 tablet by mouth four times a day as directed    acetaZOLAMIDE (DIAMOX) 500 mg CpSR Take 1 capsule (500 mg total) by mouth once daily.    amoxicillin (AMOXIL) 875 MG tablet Take 1 tablet by mouth 2 times per day for 10 days. Start day of surgery.    ARIPiprazole (ABILIFY) 2 MG Tab 0.5 tablet each morning for five days then one tablet daily each morning    brimonidine 0.2% (ALPHAGAN) 0.2 % Drop Place 1 drop into both eyes 2 (two) times daily.    buPROPion (WELLBUTRIN SR) 150 MG TBSR 12 hr tablet take 1 tablet by mouth 1 time per day    buPROPion (WELLBUTRIN SR) 200 MG SR12 Take 1 tablet by mouth once daily    cephALEXin (KEFLEX) 500 MG capsule take 1 capsule by mouth twice daily    cephALEXin (KEFLEX) 500 MG capsule Take 1 capsule (500 mg total) by mouth 2 (two) times a day.    cephALEXin (KEFLEX) 500 MG capsule Take 1 capsule (500 mg total) by mouth 2 (two) times a day.    cephALEXin (KEFLEX) 500 MG capsule Take 1 capsule (500 mg total) by mouth 2 (two) times daily.    chlorhexidine (PERIDEX) 0.12 % solution Rinse with ½ to 1 ounce  (15 to 30 mL) by mouth 2 times per day. Start day after surgery, then stop.    clonazePAM (KLONOPIN) 0.5 MG tablet Take up to 1.5 tablets by mouth daily as needed for anxiety    clonazePAM (KLONOPIN) 0.5 MG tablet Take up to 1.5 tablets daily as needed for anxiety    dextroamphetamine-amphetamine (ADDERALL) 30 mg Tab 1 Tablet(s) by mouth 1 times a day    dextromethorphan-bupropion (AUVELITY)  mg TbIE TAKE 1 TABLET BY MOUTH TWICE A DAY    dorzolamide-timolol 2-0.5% (COSOPT) 22.3-6.8 mg/mL ophthalmic solution Place 1 drop into both eyes 3 times a day    dorzolamide-timolol 2-0.5% (COSOPT) 22.3-6.8 mg/mL ophthalmic solution Instill 1 drop into both eyes 3 times a day    dorzolamide-timolol 2-0.5% (COSOPT) 22.3-6.8 mg/mL ophthalmic solution Place 1 drop into both eyes 3 (three) times daily.    dorzolamide-timolol 2-0.5% (COSOPT) 22.3-6.8 mg/mL ophthalmic solution Place 1 drop into both eyes 3 (three) times daily.    dorzolamide-timolol 2-0.5% (COSOPT) 22.3-6.8 mg/mL ophthalmic solution Place 1 drop into both eyes 2 (two) times a day.    empagliflozin (JARDIANCE) 25 mg tablet Take 1 tablet by mouth daily    ibuprofen (ADVIL,MOTRIN) 800 MG tablet Take 1 tablet by mouth 3 times per day as needed for pain    ketorolac 0.5% (ACULAR) 0.5 % Drop Place 1 drop into the right eye 4 times daily. FOR AFTER SURGERY    lamoTRIgine (LAMICTAL) 200 MG tablet Take 2 tablets by mouth once daily    losartan (COZAAR) 100 MG tablet TAKE 1/2 TABLET BY MOUTH TWICE DAILY AS DIRECTED    mercaptopurine (PURINETHOL) 50 mg tablet Take 1.5 tablets (75 mg total) by mouth every morning.    mercaptopurine (PURINETHOL) 50 mg tablet 1 1/2 TABLET BY MOUTH EVERY MORNING    mercaptopurine (PURINETHOL) 50 mg tablet take 1 and 1/2 TABLET BY MOUTH EVERY MORNING    methazoLAMIDE (NEPTAZANE) 50 MG Tab Take 1 tablet (50 mg total) by mouth 2 (two) times daily.    metoprolol succinate (TOPROL-XL) 25 MG 24 hr tablet Take 1 tablet by mouth daily     netarsudiL-latanoprost (ROCKLATAN) 0.02-0.005 % Drop instill 1 drop into both eyes as directed    netarsudiL-latanoprost (ROCKLATAN) 0.02-0.005 % Drop Instill 1 drop into both eyes as directed    netarsudiL-latanoprost (ROCKLATAN) 0.02-0.005 % Drop Place 1 drop into both eyes nightly at bedtime as directed.    netarsudiL-latanoprost (ROCKLATAN) 0.02-0.005 % Drop Place 1 drop into both eyes nightly at bedtime as directed.    ofloxacin (OCUFLOX) 0.3 % ophthalmic solution Place 1 drop in right eye four times daily    [id number] sul-NaCl-KCl-asb-C (PLENVU) 140-9-5.2 gram PPkS use as directed    prednisoLONE acetate (PRED FORTE) 1 % DrpS Place 1 drop in right eye four times daily    prednisoLONE acetate (PRED FORTE) 1 % DrpS Place 1 drop into the right eye every 2 hours (while awake). FOR AFTER SURGERY    rosuvastatin (CRESTOR) 20 MG tablet Take 1 tablet (20 mg total) by mouth once daily.    rosuvastatin (CRESTOR) 20 MG tablet Take 1 tablet by mouth daily    semaglutide (OZEMPIC) 0.25 mg or 0.5 mg (2 mg/3 mL) pen injector Inject 0.25 mg into the skin once a week    semaglutide (OZEMPIC) 0.25 mg or 0.5 mg (2 mg/3 mL) pen injector Inject 0.25 mg into the skin once a week    semaglutide (OZEMPIC) 2 mg/dose (8 mg/3 mL) PnIj Inject 2 mg into the skin once a week    semaglutide (OZEMPIC) 2 mg/dose (8 mg/3 mL) PnIj Inject 2 mg into the skin once a week    semaglutide (OZEMPIC) 2 mg/dose (8 mg/3 mL) PnIj Inject 2 mg into the skin once a week    tadalafiL (CIALIS) 20 MG Tab Take 1 tablet by mouth as needed for erectile dysfunction    tadalafiL (CIALIS) 20 MG Tab Take 1 tablet by mouth oas needed for erectile dysfunction    topiramate (TOPAMAX) 200 MG Tab Take 2 tablets by mouth once daily (at bedtime)    traMADoL (ULTRAM) 50 mg tablet Take 1 tablet by mouth every 6 hours as needed for pain    traZODone (DESYREL) 100 MG tablet Take 1 and 1/2 tablet by mouth every night at bedtime    traZODone (DESYREL) 100 MG tablet Take 2  tablets (200 mg total) by mouth nightly.    vilazodone (VIIBRYD) 20 mg Tab Take 1 tablet by mouth every night at bedtime    zolpidem (AMBIEN) 5 MG Tab Take 1 and 1/2 tablets by mouth once daily.    zolpidem (AMBIEN) 5 MG Tab Take 1 tablet by mouth every night at bedtime     No current facility-administered medications for this visit.       Allergies: Patient has No Known Allergies.    HPI  Review of Systems   Constitutional: Negative.  Negative for activity change, appetite change, chills, diaphoresis, fatigue, fever and unexpected weight change.   HENT:  Positive for voice change. Negative for nasal congestion, ear discharge, ear pain, facial swelling, hearing loss, nosebleeds, postnasal drip, rhinorrhea, sinus pressure/congestion, sneezing, sore throat, tinnitus and trouble swallowing.    Eyes:  Negative for photophobia, pain, discharge, redness and visual disturbance.   Respiratory: Negative.  Negative for cough, chest tightness, shortness of breath and wheezing.    Cardiovascular: Negative.  Negative for chest pain and palpitations.   Gastrointestinal:  Positive for diarrhea and vomiting. Negative for abdominal pain, constipation and nausea.   Genitourinary: Negative.  Negative for dysuria and frequency.   Musculoskeletal:  Positive for back pain and gait problem. Negative for arthralgias, joint swelling, myalgias, neck pain and neck stiffness.   Integumentary:  Negative for color change, pallor and rash. Negative.   Allergic/Immunologic: Negative.    Neurological:  Positive for tremors. Negative for dizziness, seizures, syncope, facial asymmetry, speech difficulty, weakness, light-headedness, numbness and headaches.   Hematological: Negative.  Negative for adenopathy. Does not bruise/bleed easily.   Psychiatric/Behavioral:  Negative for agitation, confusion, decreased concentration, dysphoric mood and sleep disturbance. The patient is nervous/anxious. The patient is not hyperactive.           Objective      Physical Exam  Constitutional:       General: He is not in acute distress.     Appearance: Normal appearance. He is well-developed. He is not ill-appearing, toxic-appearing or diaphoretic.   HENT:      Head: Not macrocephalic and not microcephalic. No raccoon eyes, Gutierrez's sign, abrasion, contusion, right periorbital erythema, left periorbital erythema or laceration. Hair is normal.      Jaw: No trismus.      Right Ear: No decreased hearing noted. No laceration, drainage, swelling or tenderness. No middle ear effusion. No foreign body. No mastoid tenderness. No hemotympanum. Tympanic membrane is not injected, scarred, perforated, erythematous, retracted or bulging. Tympanic membrane has normal mobility.      Left Ear: No decreased hearing noted. No laceration, drainage, swelling or tenderness.  No middle ear effusion. No foreign body. No mastoid tenderness. No hemotympanum. Tympanic membrane is not injected, scarred, perforated, erythematous, retracted or bulging. Tympanic membrane has normal mobility.      Ears:      Comments:   CN II-XII nl.    EOM's nl.    Cereb: mild dysmetria.    RomB: sways.         Nose: No nasal deformity, septal deviation, laceration, mucosal edema or rhinorrhea.      Right Sinus: No maxillary sinus tenderness.      Left Sinus: No maxillary sinus tenderness.      Mouth/Throat:      Mouth: Mucous membranes are not pale, not dry and not cyanotic. No lacerations or oral lesions.      Dentition: Normal dentition. No dental caries or dental abscesses.      Pharynx: Uvula midline. No oropharyngeal exudate or uvula swelling.      Tonsils: No tonsillar abscesses.   Eyes:      General: No scleral icterus.        Right eye: No foreign body or discharge.         Left eye: No foreign body or discharge.      Extraocular Movements:      Right eye: Normal extraocular motion and no nystagmus.      Left eye: Normal extraocular motion and no nystagmus.      Conjunctiva/sclera:      Right eye: Right  conjunctiva is not injected. No chemosis, exudate or hemorrhage.     Left eye: Left conjunctiva is not injected. No chemosis, exudate or hemorrhage.     Pupils: Pupils are equal.      Right eye: Pupil is round and reactive.      Left eye: Pupil is round and reactive.   Neck:      Thyroid: No thyroid mass or thyromegaly.      Vascular: No JVD.      Trachea: No tracheal tenderness or tracheal deviation.   Cardiovascular:      Rate and Rhythm: Normal rate and regular rhythm.   Pulmonary:      Effort: No tachypnea, bradypnea, accessory muscle usage or respiratory distress.      Breath sounds: Normal breath sounds. No stridor.   Abdominal:      Palpations: Abdomen is soft.   Musculoskeletal:         General: Normal range of motion.      Cervical back: No edema, erythema or rigidity. No muscular tenderness. Normal range of motion.   Lymphadenopathy:      Head:      Right side of head: No submental, submandibular, tonsillar, preauricular, posterior auricular or occipital adenopathy.      Left side of head: No submental, submandibular, tonsillar, preauricular, posterior auricular or occipital adenopathy.      Cervical: No cervical adenopathy.      Right cervical: No superficial, deep or posterior cervical adenopathy.     Left cervical: No superficial, deep or posterior cervical adenopathy.   Skin:     Coloration: Skin is not pale.      Findings: No abrasion, bruising, burn, ecchymosis, erythema, laceration, lesion or rash.   Neurological:      Mental Status: He is alert and oriented to person, place, and time. He is not disoriented.      Cranial Nerves: No cranial nerve deficit.      Sensory: No sensory deficit.      Motor: No tremor, atrophy, abnormal muscle tone or seizure activity.   Psychiatric:         Mood and Affect: Mood is not anxious or depressed. Affect is not labile, blunt, angry or inappropriate.         Speech: He is communicative. Speech is not rapid and pressured, delayed, slurred or tangential.          Behavior: Behavior normal. Behavior is not agitated, aggressive, withdrawn, hyperactive or combative.         Thought Content: Thought content normal.         Cognition and Memory: Memory is not impaired. He does not exhibit impaired recent memory or impaired remote memory.       s Notes  Maria Luisa Boyer AU.D (Audiologist)   Audiology  VNG EVALUATION     Referring physician:  Dr. Maria Antonia Burciaga Jose Manuel Brandon, a 69 y.o. male, was seen today for complaints of vertigo and imbalance. Mr. Brandon reported balance issues for approximately the last year and a half and noted it has been getting progressively worse. Patient also reported two episodes of vertigo. He reported the first one occurred when getting out of bed and another occurred randomly when he was standing. Symptoms of vertigo lasted approximately 10-15 seconds. Patient denied taking any medications that may affect the results of today's evaluation.     Tympanometry revealed Type A tympanograms, bilaterally.     Gaze nystagmus was absent.  Oculomotor function was normal.  Spontaneous nystagmus revealed square wave jerk nystagmus.     The head-hanging left Hallpike revealed square wave jerk nystagmus.  The head-hanging right Hallpike revealed square wave jerk nystagmus.  Static positional testing revealed persistent square wave jerk nystagmus that did not fixate with a visual target..     Unilateral weakness: 7% (right)  Directional preponderance 5% (right beating)  RW: 33 d/s  LW: 34 d/s  RC: 24 d/s   d/s  Fixation suppression was abnormal in left warm and right cool conditions.     Impression: Abnormal VNG; persistent square wave jerk nystagmus and abnormal fixation suppression may be suggestive of a central vestibular abnormality.     Recommend:   Consider a formal Risk of Falls assessment and formal vestibular/balance rehab  Referral to Neurology  Follow-up with Dr. Em to review the results of today's evaluation              Communicatio        Assessment and Plan     1. Imbalance        Most likely multifactoral.    F/U Neuro/ Continue PT         No follow-ups on file.

## 2025-01-13 NOTE — PROGRESS NOTES
VNG EVALUATION    Referring physician:  Dr. Maria Antonia Burciaga Jose Manuel Brandon, a 69 y.o. male, was seen today for complaints of vertigo and imbalance. Mr. Brandon reported balance issues for approximately the last year and a half and noted it has been getting progressively worse. Patient also reported two episodes of vertigo. He reported the first one occurred when getting out of bed and another occurred randomly when he was standing. Symptoms of vertigo lasted approximately 10-15 seconds. Patient denied taking any medications that may affect the results of today's evaluation.    Tympanometry revealed Type A tympanograms, bilaterally.    Gaze nystagmus was absent.  Oculomotor function was normal.  Spontaneous nystagmus revealed square wave jerk nystagmus.    The head-hanging left Hallpike revealed square wave jerk nystagmus.  The head-hanging right Hallpike revealed square wave jerk nystagmus.  Static positional testing revealed persistent square wave jerk nystagmus that did not fixate with a visual target..    Unilateral weakness: 7% (right)  Directional preponderance 5% (right beating)  RW: 33 d/s  LW: 34 d/s  RC: 24 d/s   d/s  Fixation suppression was abnormal in left warm and right cool conditions.    Impression: Abnormal VNG; persistent square wave jerk nystagmus and abnormal fixation suppression may be suggestive of a central vestibular abnormality.    Recommend:   Consider a formal Risk of Falls assessment and formal vestibular/balance rehab  Referral to Neurology  Follow-up with Dr. Em to review the results of today's evaluation

## 2025-01-14 ENCOUNTER — CLINICAL SUPPORT (OUTPATIENT)
Dept: REHABILITATION | Facility: HOSPITAL | Age: 70
End: 2025-01-14
Payer: COMMERCIAL

## 2025-01-14 DIAGNOSIS — Z74.09 IMPAIRED FUNCTIONAL MOBILITY, BALANCE, GAIT, AND ENDURANCE: Primary | ICD-10-CM

## 2025-01-14 PROCEDURE — 97110 THERAPEUTIC EXERCISES: CPT | Mod: PO

## 2025-01-14 PROCEDURE — 97530 THERAPEUTIC ACTIVITIES: CPT | Mod: PO

## 2025-01-15 ENCOUNTER — TELEPHONE (OUTPATIENT)
Dept: OPHTHALMOLOGY | Facility: CLINIC | Age: 70
End: 2025-01-15
Payer: COMMERCIAL

## 2025-01-15 RX ORDER — ASPIRIN 81 MG/1
1 TABLET ORAL DAILY
COMMUNITY

## 2025-01-15 RX ORDER — ACETAMINOPHEN, DIPHENHYDRAMINE HCL, PHENYLEPHRINE HCL 325; 25; 5 MG/1; MG/1; MG/1
10 TABLET ORAL NIGHTLY
COMMUNITY

## 2025-01-15 RX ORDER — ACETAMINOPHEN 500 MG
2000 TABLET ORAL DAILY
COMMUNITY

## 2025-01-15 RX ORDER — PSYLLIUM HUSK 3.4 G/12G
1 GRANULE ORAL 2 TIMES DAILY
COMMUNITY

## 2025-01-15 RX ORDER — MULTIVITAMIN
1 TABLET ORAL DAILY
COMMUNITY

## 2025-01-15 RX ORDER — SILDENAFIL CITRATE 20 MG/1
20 TABLET ORAL DAILY PRN
COMMUNITY
End: 2025-01-15

## 2025-01-15 RX ORDER — FERROUS GLUCONATE 324(38)MG
1 TABLET ORAL DAILY
COMMUNITY

## 2025-01-15 NOTE — PRE-PROCEDURE INSTRUCTIONS
Ozempic 2 mg - last dose taken Tuesday 1/14/2025.  Msg sent to Dr. Etienne/Staff - Anesthesia Staff - Dr. Ruddy Montoya.    PreOp Instructions given:   - Verbal medication information (what to hold and what to take)   - NPO guidelines 2400  - Arrival place directions given; time to be given the day before procedure by the   Surgeon's Office [facility]/MAP  - Bathing with antibacterial soap   - Don't wear any jewelry or bring any valuables AM of surgery   - No makeup or moisturizer to face   - No perfume/cologne, powder, lotions or aftershave   Pt. verbalized understanding.   Pt denies any h/o Anesthesia/Sedation complications or side effects.  Patient does not know arrival time.  Explained that this information comes from the surgeon's office and if they haven't heard from them by 2 or 3 pm to call the office.  Patient stated an understanding.

## 2025-01-16 ENCOUNTER — ANESTHESIA (OUTPATIENT)
Dept: SURGERY | Facility: HOSPITAL | Age: 70
End: 2025-01-16
Payer: COMMERCIAL

## 2025-01-16 ENCOUNTER — HOSPITAL ENCOUNTER (OUTPATIENT)
Facility: HOSPITAL | Age: 70
Discharge: HOME | End: 2025-01-16
Attending: STUDENT IN AN ORGANIZED HEALTH CARE EDUCATION/TRAINING PROGRAM | Admitting: STUDENT IN AN ORGANIZED HEALTH CARE EDUCATION/TRAINING PROGRAM
Payer: COMMERCIAL

## 2025-01-16 ENCOUNTER — ANESTHESIA EVENT (OUTPATIENT)
Dept: SURGERY | Facility: HOSPITAL | Age: 70
End: 2025-01-16
Payer: COMMERCIAL

## 2025-01-16 VITALS
BODY MASS INDEX: 29.61 KG/M2 | DIASTOLIC BLOOD PRESSURE: 64 MMHG | WEIGHT: 167.13 LBS | SYSTOLIC BLOOD PRESSURE: 110 MMHG | HEIGHT: 63 IN | HEART RATE: 67 BPM | RESPIRATION RATE: 16 BRPM | TEMPERATURE: 99 F | OXYGEN SATURATION: 96 %

## 2025-01-16 DIAGNOSIS — H40.1133 PRIMARY OPEN ANGLE GLAUCOMA (POAG) OF BOTH EYES, SEVERE STAGE: Primary | ICD-10-CM

## 2025-01-16 DIAGNOSIS — H40.9 GLAUCOMA, RIGHT EYE: ICD-10-CM

## 2025-01-16 DIAGNOSIS — H40.1113 PRIMARY OPEN ANGLE GLAUCOMA (POAG) OF RIGHT EYE, SEVERE STAGE: ICD-10-CM

## 2025-01-16 PROCEDURE — 37000008 HC ANESTHESIA 1ST 15 MINUTES: Performed by: STUDENT IN AN ORGANIZED HEALTH CARE EDUCATION/TRAINING PROGRAM

## 2025-01-16 PROCEDURE — 25000003 PHARM REV CODE 250

## 2025-01-16 PROCEDURE — 25000003 PHARM REV CODE 250: Performed by: STUDENT IN AN ORGANIZED HEALTH CARE EDUCATION/TRAINING PROGRAM

## 2025-01-16 PROCEDURE — 36000706: Performed by: STUDENT IN AN ORGANIZED HEALTH CARE EDUCATION/TRAINING PROGRAM

## 2025-01-16 PROCEDURE — D9220A PRA ANESTHESIA: Mod: CRNA,,, | Performed by: NURSE ANESTHETIST, CERTIFIED REGISTERED

## 2025-01-16 PROCEDURE — 27800903 OPTIME MED/SURG SUP & DEVICES OTHER IMPLANTS: Performed by: STUDENT IN AN ORGANIZED HEALTH CARE EDUCATION/TRAINING PROGRAM

## 2025-01-16 PROCEDURE — 25000003 PHARM REV CODE 250: Performed by: NURSE ANESTHETIST, CERTIFIED REGISTERED

## 2025-01-16 PROCEDURE — D9220A PRA ANESTHESIA: Mod: ANES,,, | Performed by: ANESTHESIOLOGY

## 2025-01-16 PROCEDURE — 63600175 PHARM REV CODE 636 W HCPCS: Performed by: NURSE ANESTHETIST, CERTIFIED REGISTERED

## 2025-01-16 PROCEDURE — 36000707: Performed by: STUDENT IN AN ORGANIZED HEALTH CARE EDUCATION/TRAINING PROGRAM

## 2025-01-16 PROCEDURE — 63600175 PHARM REV CODE 636 W HCPCS: Performed by: STUDENT IN AN ORGANIZED HEALTH CARE EDUCATION/TRAINING PROGRAM

## 2025-01-16 PROCEDURE — 66180 AQUEOUS SHUNT EYE W/GRAFT: CPT | Mod: 79,RT,, | Performed by: STUDENT IN AN ORGANIZED HEALTH CARE EDUCATION/TRAINING PROGRAM

## 2025-01-16 PROCEDURE — 37000009 HC ANESTHESIA EA ADD 15 MINS: Performed by: STUDENT IN AN ORGANIZED HEALTH CARE EDUCATION/TRAINING PROGRAM

## 2025-01-16 PROCEDURE — 71000044 HC DOSC ROUTINE RECOVERY FIRST HOUR: Performed by: STUDENT IN AN ORGANIZED HEALTH CARE EDUCATION/TRAINING PROGRAM

## 2025-01-16 PROCEDURE — 71000015 HC POSTOP RECOV 1ST HR: Performed by: STUDENT IN AN ORGANIZED HEALTH CARE EDUCATION/TRAINING PROGRAM

## 2025-01-16 PROCEDURE — C1783 OCULAR IMP, AQUEOUS DRAIN DE: HCPCS | Performed by: STUDENT IN AN ORGANIZED HEALTH CARE EDUCATION/TRAINING PROGRAM

## 2025-01-16 DEVICE — GLAUCOMA IMPLANT BG101-350 GLOBAL
Type: IMPLANTABLE DEVICE | Site: EYE | Status: FUNCTIONAL
Brand: BAERVELDT GLAUCOMA IMPLANT (350)

## 2025-01-16 RX ORDER — SODIUM CHLORIDE 0.9 % (FLUSH) 0.9 %
10 SYRINGE (ML) INJECTION
Status: DISCONTINUED | OUTPATIENT
Start: 2025-01-16 | End: 2025-01-16 | Stop reason: HOSPADM

## 2025-01-16 RX ORDER — ATROPINE SULFATE 10 MG/ML
SOLUTION/ DROPS OPHTHALMIC
Status: DISCONTINUED
Start: 2025-01-16 | End: 2025-01-16 | Stop reason: HOSPADM

## 2025-01-16 RX ORDER — PROPARACAINE HYDROCHLORIDE 5 MG/ML
1 SOLUTION/ DROPS OPHTHALMIC
Status: DISCONTINUED | OUTPATIENT
Start: 2025-01-16 | End: 2025-01-16 | Stop reason: HOSPADM

## 2025-01-16 RX ORDER — DEXAMETHASONE SODIUM PHOSPHATE 4 MG/ML
INJECTION, SOLUTION INTRA-ARTICULAR; INTRALESIONAL; INTRAMUSCULAR; INTRAVENOUS; SOFT TISSUE
Status: DISCONTINUED | OUTPATIENT
Start: 2025-01-16 | End: 2025-01-16 | Stop reason: HOSPADM

## 2025-01-16 RX ORDER — CEFAZOLIN SODIUM 500 MG/2.2ML
INJECTION, POWDER, FOR SOLUTION INTRAMUSCULAR; INTRAVENOUS
Status: DISCONTINUED | OUTPATIENT
Start: 2025-01-16 | End: 2025-01-16 | Stop reason: HOSPADM

## 2025-01-16 RX ORDER — PREDNISOLONE ACETATE 10 MG/ML
1 SUSPENSION/ DROPS OPHTHALMIC
Status: DISCONTINUED | OUTPATIENT
Start: 2025-01-16 | End: 2025-01-16 | Stop reason: HOSPADM

## 2025-01-16 RX ORDER — EPHEDRINE SULFATE 50 MG/ML
INJECTION, SOLUTION INTRAVENOUS
Status: DISCONTINUED | OUTPATIENT
Start: 2025-01-16 | End: 2025-01-16

## 2025-01-16 RX ORDER — LIDOCAINE HYDROCHLORIDE 20 MG/ML
INJECTION INTRAVENOUS
Status: DISCONTINUED | OUTPATIENT
Start: 2025-01-16 | End: 2025-01-16

## 2025-01-16 RX ORDER — ATROPINE SULFATE 10 MG/ML
SOLUTION/ DROPS OPHTHALMIC
Status: DISCONTINUED | OUTPATIENT
Start: 2025-01-16 | End: 2025-01-16 | Stop reason: HOSPADM

## 2025-01-16 RX ORDER — ONDANSETRON HYDROCHLORIDE 2 MG/ML
INJECTION, SOLUTION INTRAVENOUS
Status: DISCONTINUED | OUTPATIENT
Start: 2025-01-16 | End: 2025-01-16

## 2025-01-16 RX ORDER — NEOMYCIN SULFATE, POLYMYXIN B SULFATE, AND DEXAMETHASONE 3.5; 10000; 1 MG/G; [USP'U]/G; MG/G
OINTMENT OPHTHALMIC
Status: DISCONTINUED
Start: 2025-01-16 | End: 2025-01-16 | Stop reason: HOSPADM

## 2025-01-16 RX ORDER — NEOMYCIN SULFATE, POLYMYXIN B SULFATE, AND DEXAMETHASONE 3.5; 10000; 1 MG/G; [USP'U]/G; MG/G
OINTMENT OPHTHALMIC
Status: DISCONTINUED | OUTPATIENT
Start: 2025-01-16 | End: 2025-01-16 | Stop reason: HOSPADM

## 2025-01-16 RX ORDER — FENTANYL CITRATE 50 UG/ML
INJECTION, SOLUTION INTRAMUSCULAR; INTRAVENOUS
Status: DISCONTINUED | OUTPATIENT
Start: 2025-01-16 | End: 2025-01-16

## 2025-01-16 RX ORDER — PHENYLEPHRINE HYDROCHLORIDE 10 MG/ML
INJECTION INTRAVENOUS
Status: DISCONTINUED | OUTPATIENT
Start: 2025-01-16 | End: 2025-01-16

## 2025-01-16 RX ORDER — ROCURONIUM BROMIDE 10 MG/ML
INJECTION, SOLUTION INTRAVENOUS
Status: DISCONTINUED | OUTPATIENT
Start: 2025-01-16 | End: 2025-01-16

## 2025-01-16 RX ORDER — GLUCAGON 1 MG
1 KIT INJECTION
Status: DISCONTINUED | OUTPATIENT
Start: 2025-01-16 | End: 2025-01-16 | Stop reason: HOSPADM

## 2025-01-16 RX ORDER — VASOPRESSIN 20 [USP'U]/ML
INJECTION, SOLUTION INTRAMUSCULAR; SUBCUTANEOUS
Status: DISCONTINUED | OUTPATIENT
Start: 2025-01-16 | End: 2025-01-16

## 2025-01-16 RX ORDER — MIDAZOLAM HYDROCHLORIDE 1 MG/ML
INJECTION INTRAMUSCULAR; INTRAVENOUS
Status: DISCONTINUED | OUTPATIENT
Start: 2025-01-16 | End: 2025-01-16

## 2025-01-16 RX ORDER — MOXIFLOXACIN 5 MG/ML
1 SOLUTION/ DROPS OPHTHALMIC
Status: DISCONTINUED | OUTPATIENT
Start: 2025-01-16 | End: 2025-01-16 | Stop reason: HOSPADM

## 2025-01-16 RX ORDER — HYDROMORPHONE HYDROCHLORIDE 1 MG/ML
0.2 INJECTION, SOLUTION INTRAMUSCULAR; INTRAVENOUS; SUBCUTANEOUS EVERY 5 MIN PRN
Status: DISCONTINUED | OUTPATIENT
Start: 2025-01-16 | End: 2025-01-16 | Stop reason: HOSPADM

## 2025-01-16 RX ORDER — DEXMEDETOMIDINE HYDROCHLORIDE 100 UG/ML
INJECTION, SOLUTION INTRAVENOUS
Status: DISCONTINUED | OUTPATIENT
Start: 2025-01-16 | End: 2025-01-16

## 2025-01-16 RX ORDER — SUCCINYLCHOLINE CHLORIDE 20 MG/ML
INJECTION INTRAMUSCULAR; INTRAVENOUS
Status: DISCONTINUED | OUTPATIENT
Start: 2025-01-16 | End: 2025-01-16

## 2025-01-16 RX ORDER — PROPOFOL 10 MG/ML
VIAL (ML) INTRAVENOUS
Status: DISCONTINUED | OUTPATIENT
Start: 2025-01-16 | End: 2025-01-16

## 2025-01-16 RX ADMIN — ONDANSETRON 4 MG: 2 INJECTION INTRAMUSCULAR; INTRAVENOUS at 10:01

## 2025-01-16 RX ADMIN — VASOPRESSIN 1 UNITS: 20 INJECTION INTRAVENOUS at 10:01

## 2025-01-16 RX ADMIN — DEXMEDETOMIDINE 4 MCG: 100 INJECTION, SOLUTION, CONCENTRATE INTRAVENOUS at 10:01

## 2025-01-16 RX ADMIN — EPHEDRINE SULFATE 10 MG: 50 INJECTION INTRAVENOUS at 10:01

## 2025-01-16 RX ADMIN — PROPOFOL 200 MG: 10 INJECTION, EMULSION INTRAVENOUS at 09:01

## 2025-01-16 RX ADMIN — MOXIFLOXACIN OPHTHALMIC 1 DROP: 5 SOLUTION/ DROPS OPHTHALMIC at 09:01

## 2025-01-16 RX ADMIN — PHENYLEPHRINE HYDROCHLORIDE 200 MCG: 10 INJECTION INTRAVENOUS at 09:01

## 2025-01-16 RX ADMIN — MIDAZOLAM HYDROCHLORIDE 2 MG: 2 INJECTION, SOLUTION INTRAMUSCULAR; INTRAVENOUS at 09:01

## 2025-01-16 RX ADMIN — VASOPRESSIN 2 UNITS: 20 INJECTION INTRAVENOUS at 10:01

## 2025-01-16 RX ADMIN — SODIUM CHLORIDE: 9 INJECTION, SOLUTION INTRAVENOUS at 09:01

## 2025-01-16 RX ADMIN — PREDNISOLONE ACETATE 1 DROP: 10 SUSPENSION/ DROPS OPHTHALMIC at 09:01

## 2025-01-16 RX ADMIN — SODIUM CHLORIDE, SODIUM LACTATE, POTASSIUM CHLORIDE, AND CALCIUM CHLORIDE: .6; .31; .03; .02 INJECTION, SOLUTION INTRAVENOUS at 10:01

## 2025-01-16 RX ADMIN — PHENYLEPHRINE HYDROCHLORIDE 200 MCG: 10 INJECTION INTRAVENOUS at 10:01

## 2025-01-16 RX ADMIN — NOREPINEPHRINE BITARTRATE 0.08 MCG/KG/MIN: 1 INJECTION, SOLUTION, CONCENTRATE INTRAVENOUS at 10:01

## 2025-01-16 RX ADMIN — FENTANYL CITRATE 50 MCG: 50 INJECTION, SOLUTION INTRAMUSCULAR; INTRAVENOUS at 10:01

## 2025-01-16 RX ADMIN — PROPARACAINE HYDROCHLORIDE 1 DROP: 5 SOLUTION/ DROPS OPHTHALMIC at 09:01

## 2025-01-16 RX ADMIN — ROCURONIUM BROMIDE 5 MG: 10 INJECTION, SOLUTION INTRAVENOUS at 09:01

## 2025-01-16 RX ADMIN — SUCCINYLCHOLINE 160 MG: 20 INJECTION, SOLUTION INTRAMUSCULAR; INTRAVENOUS at 09:01

## 2025-01-16 RX ADMIN — LIDOCAINE HYDROCHLORIDE 100 MG: 20 INJECTION INTRAVENOUS at 09:01

## 2025-01-16 NOTE — DISCHARGE SUMMARY
[facility] (1st Fl)  Discharge Note  Short Stay    Procedure(s) (LRB):  INSERTION, DEVICE, FOR GLAUCOMA (Right)    OUTCOME: Patient tolerated treatment/procedure well without complication and is now ready for discharge.    DISPOSITION: Home or Self Care    FINAL DIAGNOSIS:  Primary open angle glaucoma (POAG) of right eye, severe stage    FOLLOWUP: In clinic    DISCHARGE INSTRUCTIONS:  No discharge procedures on file.     TIME SPENT ON DISCHARGE: 5 minutes

## 2025-01-16 NOTE — ANESTHESIA PROCEDURE NOTES
Intubation    Date/Time: 1/16/2025 9:50 AM    Performed by: Raysa Newell CRNA  Authorized by: Tamara Olson MD    Intubation:     Induction:  Rapid sequence induction    Intubated:  Postinduction    Mask Ventilation:  N/a    Attempts:  1    Attempted By:  CRNA    Method of Intubation:  Video laryngoscopy    Blade:  Long 3    Laryngeal View Grade: Grade I - full view of cords      Difficult Airway Encountered?: No      Complications:  None    Airway Device:  Oral endotracheal tube    Airway Device Size:  7.5    Style/Cuff Inflation:  Cuffed (inflated to minimal occlusive pressure)    Inflation Amount (mL):  8    Tube secured:  23    Secured at:  The lips    Placement Verified By:  Capnometry and Revisualization with laryngoscopy    Complicating Factors:  None    Findings Post-Intubation:  BS equal bilateral and atraumatic/condition of teeth unchanged

## 2025-01-16 NOTE — TRANSFER OF CARE
"Anesthesia Transfer of Care Note    Patient: Gualberto Brandon    Procedure(s) Performed: Procedure(s) (LRB):  INSERTION, DEVICE, FOR GLAUCOMA (Right)    Patient location: PACU    Anesthesia Type: general    Transport from OR: Transported from OR on 6-10 L/min O2 by face mask with adequate spontaneous ventilation    Post pain: adequate analgesia    Post assessment: no apparent anesthetic complications    Post vital signs: stable    Level of consciousness: sedated    Nausea/Vomiting: no nausea/vomiting    Complications: none    Transfer of care protocol was followed      Last vitals: Visit Vitals  /79 (BP Location: Left arm, Patient Position: Lying)   Pulse 69   Temp 37.1 °C (98.7 °F) (Skin)   Resp 18   Ht 5' 3" (1.6 m)   Wt 75.8 kg (167 lb 1.7 oz)   SpO2 98%   BMI 29.60 kg/m²     "

## 2025-01-16 NOTE — OP NOTE
Gualberto Brandon  [id number]  1/16/2025    PREOPERATIVE DIAGNOSIS: Primary open angle glaucoma (POAG) of right eye, severe stage    POSTOPERATIVE DIAGNOSIS: Same as pre-operative diagnosis    OPERATION PERFORMED: Procedure(s):  INSERTION, DEVICE, FOR GLAUCOMA(Baerveldt 350 with CorneaGen patch graft) right eye    SURGEON: Primary: Johana Etienne MD    ANESTHESIA: General endotracheal    COMPLICATIONS:  none    FINDINGS:  Anatomy as expected    ESTIMATED BLOOD LOSS:   minimal}    SPECIMENS:  * No specimens in log *    IMPLANTS:    Implant Name Type Inv. Item Serial No.  Lot No. LRB No. Used Action   IMPLANT BAERVELDT GLAUCOMA 350 - [id number]  IMPLANT BAERVELDT GLAUCOMA 350 [id number] [employer]  Right 1 Implanted   Cornea Gen      Right 1 Implanted       JUSTIFICATION OF SURGERY:  Gualberto Brandon is a 69 y.o. male with a history and physical exam consistent with uncontrolled intraocular pressure.  He has been treated with  medical and laser therapy, however the treatments were not sufficient.  We extensively discussed his medical conditions and multiple treatment options, including the risks, benefits, and alternatives of surgery.  He understands the potential for bleeding, infection, vision loss, and other related complications and recovery issues.  After answering all the questions, there was an understanding of the issues involved with surgery and the consent was signed.    PROCEDURE:  Betadine paint and drop in holding room  Prep and drape in usual manner in OR  Time out according to protocol   Superotemporal traction suture  Superotemporal fornix-based conjunctival flap  Bleeding controlled with cautery  Adequate pocket created using blunt and sharp dissection   Superior rectus and lateral rectus muscles isolated  Baerveldt implant inserted under the muscles  Implant sutured onto the globe using 8-0 nylon sutures with the knot rotated into the eyelets of the  implant  Tube ligated with 7-0 vicryl and tested for adequacy of ligature with balanced salt solution  Three  fenestrations made with a the spatula needle of the 7-0 vicryl suture  Tube trimmed to appropriate length with bevel away from the iris  23 gauge needle track made into the anterior chamber  Tube threaded into the eye  Corneal patch graft placed over the anterior portion of the tube and secured with 7-0 vicryl  Conjunctiva closed with 8-0 vicryl interrupted sutures  Inferotemporal paracentesis  Subconjunctival injection of antibiotic and steroid  Removal of traction suture  Topical atropine drops  Topical antibiotic/steroid ointment  Dry sterile dressingand shield    The patient tolerated the procedure well. There were no unexpected findings or complications.

## 2025-01-16 NOTE — ANESTHESIA POSTPROCEDURE EVALUATION
Anesthesia Post Evaluation    Patient: Gualberto Brandon    Procedure(s) Performed: Procedure(s) (LRB):  INSERTION, DEVICE, FOR GLAUCOMA (Right)    Final Anesthesia Type: general      Patient location during evaluation: PACU  Patient participation: Yes- Able to Participate  Level of consciousness: awake and alert and oriented  Post-procedure vital signs: reviewed and stable  Pain management: adequate  Airway patency: patent    PONV status at discharge: No PONV  Anesthetic complications: no      Cardiovascular status: blood pressure returned to baseline and hemodynamically stable  Respiratory status: unassisted and spontaneous ventilation  Hydration status: euvolemic  Follow-up not needed.              Vitals Value Taken Time   /57 01/16/25 1201   Temp 37.1 °C (98.8 °F) 01/16/25 1115   Pulse 70 01/16/25 1207   Resp 18 01/16/25 1208   SpO2 95 % 01/16/25 1207   Vitals shown include unfiled device data.      No case tracking events are documented in the log.      Pain/Farideh Score: Farideh Score: 10 (1/16/2025 11:45 AM)

## 2025-01-16 NOTE — INTERVAL H&P NOTE
BRIEF HISTORY, PERTINENT EXAM:  H&P reviewed. No changes.    ASSESSMENT/PLAN:  Proceed with surgery as planned -- Baerveldt 350 with CorneaGen patch graft, right eye    The patient has uncontrolled eye pressure and is at risk for complete vision loss without urgent surgery. Proceed with surgery.    Johana Etienne MD

## 2025-01-16 NOTE — DISCHARGE INSTRUCTIONS
POST OP DROPS:    Do not start any post operative drops in the operative eye today. Please keep the shield on at all times until you see Dr. Etienne.  Please bring all of your drops with you to your post-op appointment.    GLAUCOMA DROPS:  Drug Eye Top Color Breakfast Lunch Dinner Bedtime   Alphagan:  Brimonidine LEFT Purple X  X    Cosopt:  Dorzolamide/  Timolol LEFT Blue X  X    Rocklatan LEFT White    X     WAIT 5 MINUTES BETWEEN DROPS. THE ORDER OF THE DROPS DOES NOT MATTER.    Take Tylenol as needed for pain  No heavy lifting, bending or straining for 10 days  Do not rub your eyes for 1 month  Do not get water in your eyes for 1 week  No swimming for 1 month  Please sleep with the shield over your eye for the next week to protect your eye during sleep    If you experience any increasing redness, sensitivity to light, pain, or changes in vision, please call the office immediately.    Johana Etienne MD  Office number: [phone]

## 2025-01-16 NOTE — ANESTHESIA PREPROCEDURE EVALUATION
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 Etienne and pre-op center.  Per Dr. Etienne, pt  "has uncontrolled eye pressure and is at risk for complete vision loss without urgent surgery. Proceed with surgery."  Will plan for RSI with ETT    No significant cardiac or pulmonary hx.    No results found for: "WBC", "HGB", "HCT", "MCV", "PLT"      Chemistry        Component Value Date/Time     04/05/2024 0804    K 3.9 04/05/2024 0804    CO2 23 04/05/2024 0804    BUN 18 04/05/2024 0804    BUN 9.0 02/27/2021 0610    CREATININE 0.99 04/05/2024 0804     (H) 04/05/2024 0804        Component Value Date/Time    CALCIUM 9.4 04/05/2024 0804    ALKPHOS 88 04/05/2024 0804    AST 31 04/05/2024 0804    ALT 61 (H) 04/05/2024 0804    ALT 24 02/22/2021 0944    BILITOT 0.4 04/05/2024 0804    ESTGFRAFRICA >105 02/27/2021 0610        No results found for this or any previous visit.         Pre-op Assessment    I have reviewed the Patient Summary Reports.     I have reviewed the Nursing Notes. I have reviewed the NPO Status.      Review of Systems      Physical Exam  General: Well nourished, Cooperative, Alert and Oriented    Airway:  Mallampati: II   Mouth Opening: Normal  TM Distance: Normal  Neck ROM: Normal ROM    Dental:  Intact        Anesthesia Plan  Type of Anesthesia, risks & benefits discussed:    Anesthesia Type: Gen ETT  Intra-op Monitoring Plan: Standard ASA Monitors  Post Op Pain Control Plan: multimodal analgesia  Induction:  IV and rapid sequence  Airway Plan: Direct, Post-Induction  Informed Consent: Informed consent signed with the Patient and all parties understand the risks and agree with anesthesia plan.  All questions answered. Patient consented to " blood products? Yes  ASA Score: 2    Ready For Surgery From Anesthesia Perspective.     .

## 2025-01-17 ENCOUNTER — OFFICE VISIT (OUTPATIENT)
Dept: OPHTHALMOLOGY | Facility: CLINIC | Age: 70
End: 2025-01-17
Payer: COMMERCIAL

## 2025-01-17 DIAGNOSIS — H40.1133 PRIMARY OPEN ANGLE GLAUCOMA (POAG) OF BOTH EYES, SEVERE STAGE: Primary | ICD-10-CM

## 2025-01-17 PROCEDURE — 99999 PR PBB SHADOW E&M-EST. PATIENT-LVL IV: CPT | Mod: PBBFAC,,, | Performed by: STUDENT IN AN ORGANIZED HEALTH CARE EDUCATION/TRAINING PROGRAM

## 2025-01-17 PROCEDURE — 99024 POSTOP FOLLOW-UP VISIT: CPT | Mod: S$GLB,,, | Performed by: STUDENT IN AN ORGANIZED HEALTH CARE EDUCATION/TRAINING PROGRAM

## 2025-01-17 RX ORDER — METHAZOLAMIDE 50 MG/1
50 TABLET ORAL 3 TIMES DAILY
Qty: 90 TABLET | Refills: 3 | Status: SHIPPED | OUTPATIENT
Start: 2025-01-17 | End: 2026-01-17

## 2025-01-17 NOTE — PATIENT INSTRUCTIONS
POST OP DROPS:    In the RIGHT eye:  Prednisolone Acetate (PINK or WHITE top)  SHAKE then place 1 drop 6x daily    Moxifloxacin (TAN top)  Place 1 drop 4 times daily for 7 days then stop (end date: 1/24/2025).    Atropine (RED top)  Place 1 drop 2 times daily    GLAUCOMA DROPS:  Drug Eye Top Color Breakfast Lunch Dinner Bedtime   Alphagan:  Brimonidine BOTH Purple X  X    Cosopt:  Dorzolamide/  Timolol BOTH Blue X  X    Rocklatan LEFT White    X     PILLS:  Take 50 mg Neptazane three times daily by mouth    No heavy lifting, bending or straining for 10 days  Do not rub your eyes for 1 month  Do not get water in your eyes for 1 week  No swimming for 1 month  Please sleep with the shield over your eye for the next week to protect your eye during sleep    If you experience any increasing redness, sensitivity to light, pain, or changes in vision, please call the office immediately.    Johana Etienne MD  Office number: [phone]

## 2025-01-17 NOTE — PROGRESS NOTES
HPI     OD 01/16/2025  1 day post op     Last edited by Sarah Huizar MA on 1/17/2025 11:09 AM.            Assessment /Plan     For exam results, see Encounter Report.    Primary open angle glaucoma (POAG) of both eyes, severe stage    Other orders  -     methazoLAMIDE (NEPTAZANE) 50 MG Tab; Take 1 tablet (50 mg total) by mouth 3 (three) times daily.  Dispense: 90 tablet; Refill: 3      Dr Stanford x 30 years    Patient with sig other  Self employed : Financing // Loans    Advanced POAG   + APD OD    + Steroids to Knees --> discussed steroid response and hold until further notice    CCT  541 // 505    Mid -low teens --> not achieved and discussed options  --> meds & GDD    Both eyes --> tolerating well & good adherence --> CSM  Cosopt TID --> consider BID 2/2 [place]  Rocklatan q day  Brimonidine  Diamox 500 mg PO BID --> Holding  --> GI intolerant // diarrhea / dehydration --> improved  Neptazine 50 PO TID     Hydrus OU @ June 2024  ?? Patent OU    [facility] --> G-probe Dr Esparza @08/2024 12/19/2024  Pre-Tx 37  OD YAG disruption // puncture of Hydrus --> possible response 12/31/2024 --> 29    SP OD G-Probe CPC  Andrew Esparza at [facility] 08/29/2024  Transscleral Diode Laser Cyclophotocoagulation (CPC) PROCEDURE:  Retrobulbar block performed  The G-probe was applied to the sclera with the footplate of the probe tipe directed away from the cornea with the laser directed over the ciliary processes between 1 to 2 mm posterior to the limbus.  Diode laser settings: 1250 mW power, 4 sec duration  Treatment of the ciliary processes was performed 360 degrees: 24 shots in 4 quadrants (3' and 9' oclock meridians spared)    PC IOL OU  Quiet  Observe    12/05/2024  Color better than contour              Patient with wife  Discussed options & used eye model with Ahmed &  R & B sx c Q & A  Further discussion with Dr Etienne 12/31/2024 01/17/2025  POD#1 s/p  (3 fenestrations) OD 1/16/25  IOP 24  off drops    Plan:  - PF 6/0, Moxi 4/0, Atropine 2/0  - Restart Cosopt 2/2, Brim 2/2, Rock 0/1  - Restart MZM 50 mg PO TID    POW#1 -- [facility], IOP    Johana Etienne MD

## 2025-01-20 ENCOUNTER — PATIENT MESSAGE (OUTPATIENT)
Dept: OPHTHALMOLOGY | Facility: CLINIC | Age: 70
End: 2025-01-20
Payer: COMMERCIAL

## 2025-01-24 ENCOUNTER — OFFICE VISIT (OUTPATIENT)
Dept: OPHTHALMOLOGY | Facility: CLINIC | Age: 70
End: 2025-01-24
Payer: COMMERCIAL

## 2025-01-24 DIAGNOSIS — H40.1133 PRIMARY OPEN ANGLE GLAUCOMA (POAG) OF BOTH EYES, SEVERE STAGE: Primary | ICD-10-CM

## 2025-01-24 PROCEDURE — 99999 PR PBB SHADOW E&M-EST. PATIENT-LVL V: CPT | Mod: PBBFAC,,, | Performed by: STUDENT IN AN ORGANIZED HEALTH CARE EDUCATION/TRAINING PROGRAM

## 2025-01-24 PROCEDURE — 99024 POSTOP FOLLOW-UP VISIT: CPT | Mod: S$GLB,,, | Performed by: STUDENT IN AN ORGANIZED HEALTH CARE EDUCATION/TRAINING PROGRAM

## 2025-01-24 NOTE — PATIENT INSTRUCTIONS
POST OP DROPS:    In the RIGHT eye:  Prednisolone Acetate (PINK or WHITE top)  SHAKE then place 1 drop 4x daily    STOP Moxifloxacin (TAN top) and Atropine (RED top)    GLAUCOMA DROPS:  Drug Eye Top Color Breakfast Lunch Dinner Bedtime   Alphagan:  Brimonidine BOTH Purple X  X    Cosopt:  Dorzolamide/  Timolol BOTH Blue X  X    Rocklatan LEFT White    X     ORAL PILLS  Methazolamide (Neptazane): 50 mg tablet 3 times daily    WAIT 5 MINUTES BETWEEN DROPS. THE ORDER OF THE DROPS DOES NOT MATTER.    No heavy lifting, bending or straining for 10 days  Do not rub your eyes for 1 month  No swimming for 1 month  Please sleep with the shield over your eye for the next week to protect your eye during sleep    If you experience any increasing redness, sensitivity to light, pain, or changes in vision, please call the office immediately.    Johana Etienne MD  Office number: [phone]

## 2025-01-24 NOTE — PROGRESS NOTES
HPI    DLS: 01/172025  S/P  01/16/2025    1 week post op   Pt states that his eye is itchy     PF 6 times OD   Vigamox QID OD     Last edited by Sarah Huizar MA on 1/24/2025  9:13 AM.            Assessment /Plan     For exam results, see Encounter Report.    There are no diagnoses linked to this encounter.    Dr Stanford x 30 years    Patient with sig other  Self employed : Financing // Loans    Advanced POAG   + APD OD    + Steroids to Knees --> discussed steroid response and hold until further notice    CCT  541 // 505    Mid -low teens --> not achieved and discussed options  --> meds & GDD    Both eyes --> tolerating well & good adherence --> CSM  Cosopt TID --> consider BID 2/2 [place]  Rocklatan q day  Brimonidine  Diamox 500 mg PO BID --> Holding  --> GI intolerant // diarrhea / dehydration --> improved  Neptazine 50 PO TID     Hydrus OU @ June 2024  ?? Patent OU    [facility] --> G-probe Dr Esparza @08/2024 12/19/2024  Pre-Tx 37  OD YAG disruption // puncture of Hydrus --> possible response 12/31/2024 --> 29    SP OD G-Probe CPC  Andrew Esparza at [place] 08/29/2024  Transscleral Diode Laser Cyclophotocoagulation (CPC) PROCEDURE:  Retrobulbar block performed  The G-probe was applied to the sclera with the footplate of the probe tipe directed away from the cornea with the laser directed over the ciliary processes between 1 to 2 mm posterior to the limbus.  Diode laser settings: 1250 mW power, 4 sec duration  Treatment of the ciliary processes was performed 360 degrees: 24 shots in 4 quadrants (3' and 9' oclock meridians spared)    PC IOL OU  Quiet  Observe    12/05/2024  Color better than contour              Patient with wife  Discussed options & used eye model with Ahmed &  R & B sx c Q & A  Further discussion with Dr Etienne 12/31/2024 01/24/2025  POW#1 s/p  (3 fenestrations) OD 1/16/25  IOP 16 on Cosopt 2/2, Brim 2/2, Rock 0/1, MZM 50 PO TID    Plan:  - Decrease to PF  4/0  - STOP Moxi 4/0, Atropine 2/0  - Continue Cosopt 2/2, Brim 2/2, Rock 0/1  - Continue MZM 50 mg PO TID    POW#3 -- [facility], IOP, sooner PRN    Johana Etienne MD

## 2025-01-27 NOTE — PROGRESS NOTES
[facility]   Physical Therapy Treatment Note      Name: Gualberto Brandon  Clinic Number: [id number]    Therapy Diagnosis:   Encounter Diagnosis   Name Primary?    Impaired functional mobility, balance, gait, and endurance Yes     Physician: Barbara Waldrop MD    Visit Date: 1/28/2025    Physician Orders: PT Eval and Treat   Medical Diagnosis from Referral: R26.9 (ICD-10-CM) - Abnormal gait   Evaluation Date: 12/13/2024  Authorization Period Expiration: 12/2/2024  Plan of Care Expiration: 2/7/205  Progress Note Due: 2/7/2025  Date of Surgery: NA2  Visit # / Visits authorized: 04/ 20  FOTO: 1/ 3     Precautions: Standard and Fall     Time In: 0800  Time Out: 0845  Total Billable Time: 45 minutes    PTA Visit #: 0/5       Subjective     Patient reports: that he has been feeling down about his overall mobility and motivation     He was compliant with HEP   Response to previous treatment:  felt ok   Functional change: on going    Pain: 0/10  Location: denies pain     Objective      Objective Measures updated at progress report unless specified.        Treatment     Gualberto received the treatments listed below:      therapeutic exercises to develop strength, endurance, and ROM for 10 minutes including:    10 minutes SCIFIT seated elliptical for CV endurance and LE strength, level 6.0      neuromuscular re-education activities to improve: Balance, Coordination, and Proprioception for 10  minutes. The following activities were included:    10 reps double large cone tapping , CGA  - standing on foam pad     2 laps tandem ambulation, CGA    2 x 30 seconds BLE leading tandem stance, CGA     therapeutic activities to improve functional performance for 25 minutes, including:      X 15 reps sit to stands while standing on hard side of BOSU, CGA     In hallway while holding tidal tank:   2 x 100 feet ambulation, CGA   2 x 100 feet ambulation with horizontal head turns, CGA   2 x 100 feet ambulation  with vertical head turns, CGA     Time above includes time for discussion of the purpose of PT and barriers to progress with therapy including the patient's anxiety and mental state    Time above includes time for frequent rest breaks     Patient Education and Home Exercises       Education provided:   - benefits of todays interventions, HEP    Written Home Exercises Provided: Yes. Exercises were reviewed and Gualberto was able to demonstrate them prior to the end of the session.  Gualberto demonstrated good  understanding of the education provided. See Electronic Medical Record under Patient Instructions for exercises provided during therapy sessions    Assessment     Mr. Brandon tolerated today's session fairly. Patient continues to demonstrate significant anxiety and self limiting behaviors throughout session that will likely be a large barrier for progress with therapy. The patient demonstrates anxiety when balance activities are challenging despite therapist educating patient that balance activities have to be challenging in order to be beneficial to the patient.  He remains appropriate for skilled Physical Therapy to address remaining functional mobility deficits.        Gualberto Is progressing well towards his goals.   Patient prognosis is Good.     Patient will continue to benefit from skilled outpatient physical therapy to address the deficits listed in the problem list box on initial evaluation, provide pt/family education and to maximize pt's level of independence in the home and community environment.     Patient's spiritual, cultural and educational needs considered and pt agreeable to plan of care and goals.     Anticipated barriers to physical therapy: co-morbidities, anxiety/ depression      Goals:  Short Term Goals: 4 weeks   1. Patient to be (I) with established home exercise program. MET 1/14/2025  2. Patient to improve bilateral hip extension strength by 1/3 MMT to improve balance and functional  mobility.  MET 1/14/2025  3. Patient to improve 5 time sit to stand time to 10 seconds for improved functional mobility and strength.  MET 1/14/2025  4. Patient to improve GST condition 4 to 10 seconds with BLE leading for improved stability with mobility. Ongoing  5. Patient to improve SLS time to 5 seconds for decreased risk for falls with community ambulation Ongoing        Long Term Goals: 8 weeks   1. Patient to be (I) with advanced home exercise program.Ongoing  2. Patient to improve bilateral hip extension strength by 2/3 MMT to improve balance and functional mobility. Ongoing  3. Patient to improve 5 time sit to stand time to 8 seconds for improved functional mobility and strength. Ongoing  4. Patient to improve GST condition 4 to 20 seconds with BLE leading for improved stability with mobility. Ongoing  5. Patient to improve SLS time to 10 seconds for decreased risk for falls with community ambulation Ongoing    Plan     Cont to progress functional mobility and gait     Rosi Cm, PT

## 2025-01-28 ENCOUNTER — CLINICAL SUPPORT (OUTPATIENT)
Dept: REHABILITATION | Facility: HOSPITAL | Age: 70
End: 2025-01-28
Payer: COMMERCIAL

## 2025-01-28 DIAGNOSIS — Z74.09 IMPAIRED FUNCTIONAL MOBILITY, BALANCE, GAIT, AND ENDURANCE: Primary | ICD-10-CM

## 2025-01-28 PROCEDURE — 97112 NEUROMUSCULAR REEDUCATION: CPT | Mod: PO

## 2025-01-28 PROCEDURE — 97530 THERAPEUTIC ACTIVITIES: CPT | Mod: PO

## 2025-01-28 PROCEDURE — 97110 THERAPEUTIC EXERCISES: CPT | Mod: PO

## 2025-01-30 ENCOUNTER — CLINICAL SUPPORT (OUTPATIENT)
Dept: REHABILITATION | Facility: HOSPITAL | Age: 70
End: 2025-01-30
Payer: COMMERCIAL

## 2025-01-30 DIAGNOSIS — Z74.09 IMPAIRED FUNCTIONAL MOBILITY, BALANCE, GAIT, AND ENDURANCE: Primary | ICD-10-CM

## 2025-01-30 PROCEDURE — 97530 THERAPEUTIC ACTIVITIES: CPT | Mod: PO

## 2025-01-30 PROCEDURE — 97110 THERAPEUTIC EXERCISES: CPT | Mod: PO

## 2025-01-30 PROCEDURE — 97112 NEUROMUSCULAR REEDUCATION: CPT | Mod: PO

## 2025-01-30 NOTE — PROGRESS NOTES
[facility]   Physical Therapy Treatment Note      Name: Gualberto Brandon  Clinic Number: [id number]    Therapy Diagnosis:   Encounter Diagnosis   Name Primary?    Impaired functional mobility, balance, gait, and endurance Yes       Physician: Barbara Waldrop MD    Visit Date: 1/30/2025    Physician Orders: PT Eval and Treat   Medical Diagnosis from Referral: R26.9 (ICD-10-CM) - Abnormal gait   Evaluation Date: 12/13/2024  Authorization Period Expiration: 12/2/2024  Plan of Care Expiration: 2/7/205  Progress Note Due: 2/7/2025  Date of Surgery: NA2  Visit # / Visits authorized: 05/ 20  FOTO: 1/ 3     Precautions: Standard and Fall     Time In: 0802  Time Out: 0847  Total Billable Time: 45 minutes    PTA Visit #: 0/5       Subjective     Patient reports: that he has a lot of fogginess in his L eye; he closed the eye occasionally with mobility,     He was compliant with HEP   Response to previous treatment:  felt ok   Functional change: on going    Pain: 0/10  Location: denies pain     Objective      Objective Measures updated at progress report unless specified.        Treatment     Gualberto received the treatments listed below:      therapeutic exercises to develop strength, endurance, and ROM for 10 minutes including:    10 minutes SCIFIT seated elliptical for CV endurance and LE strength, level 6.0      neuromuscular re-education activities to improve: Balance, Coordination, and Proprioception for 15  minutes. The following activities were included:    Performed in open environment:  6 x 20 feet, tandem ambulation, Contact guard assistance - standby by assist   X 5 laps x 15 feet, Walking marches with cone taps, Minimal assist - Contact guard assistance     X 3 laps each way, Sidestepping on foam balance beam, Contact guard assistance - standby by assist     therapeutic activities to improve functional performance for 20 minutes, including:    In hallway while holding tidal tank:   2  x 100 feet ambulation, standby by assist    2 x 100 feet ambulation with horizontal head turns, standby by assist    2 x 100 feet ambulation with vertical head turns, standby by assist      X 4 laps, Multi-directional stepping around plinth, Contact guard assistance - standby by assist     Time above includes time for discussion of the purpose of PT and barriers to progress with therapy including the patient's anxiety and mental state    Time above includes time for frequent rest breaks     Patient Education and Home Exercises       Education provided:   - benefits of todays interventions, HEP    Written Home Exercises Provided: Yes. Exercises were reviewed and Gualberto was able to demonstrate them prior to the end of the session.  Gualberto demonstrated good  understanding of the education provided. See Electronic Medical Record under Patient Instructions for exercises provided during therapy sessions    Assessment     Mr. Brandon tolerated today's session fairly. He seeks a lot of verbal feedback throughout session and requires encouragement due to tendency to become frustrated with even mild challenged to his balance. Performance improved as trials progressed. He inquired about use of an elliptical vs walking. PT informed him that the best exercise he can do is an exercise that he enjoys; therefore, if use of an elliptical will encourage him to be compliant with exercise, it is recommended. He remains appropriate for skilled Physical Therapy to address remaining functional mobility deficits.          Gualberto Is progressing well towards his goals.   Patient prognosis is Good.     Patient will continue to benefit from skilled outpatient physical therapy to address the deficits listed in the problem list box on initial evaluation, provide pt/family education and to maximize pt's level of independence in the home and community environment.     Patient's spiritual, cultural and educational needs considered and pt  agreeable to plan of care and goals.     Anticipated barriers to physical therapy: co-morbidities, anxiety/ depression      Goals:  Short Term Goals: 4 weeks   1. Patient to be (I) with established home exercise program. MET 1/14/2025  2. Patient to improve bilateral hip extension strength by 1/3 MMT to improve balance and functional mobility.  MET 1/14/2025  3. Patient to improve 5 time sit to stand time to 10 seconds for improved functional mobility and strength.  MET 1/14/2025  4. Patient to improve GST condition 4 to 10 seconds with BLE leading for improved stability with mobility. Ongoing  5. Patient to improve SLS time to 5 seconds for decreased risk for falls with community ambulation Ongoing        Long Term Goals: 8 weeks   1. Patient to be (I) with advanced home exercise program.Ongoing  2. Patient to improve bilateral hip extension strength by 2/3 MMT to improve balance and functional mobility. Ongoing  3. Patient to improve 5 time sit to stand time to 8 seconds for improved functional mobility and strength. Ongoing  4. Patient to improve GST condition 4 to 20 seconds with BLE leading for improved stability with mobility. Ongoing  5. Patient to improve SLS time to 10 seconds for decreased risk for falls with community ambulation Ongoing    Plan     Cont to progress functional mobility and gait     Corazon Chacko, PT

## 2025-02-03 ENCOUNTER — CLINICAL SUPPORT (OUTPATIENT)
Dept: REHABILITATION | Facility: HOSPITAL | Age: 70
End: 2025-02-03
Payer: COMMERCIAL

## 2025-02-03 DIAGNOSIS — Z74.09 IMPAIRED FUNCTIONAL MOBILITY, BALANCE, GAIT, AND ENDURANCE: Primary | ICD-10-CM

## 2025-02-03 PROCEDURE — 97110 THERAPEUTIC EXERCISES: CPT | Mod: PO

## 2025-02-03 PROCEDURE — 97530 THERAPEUTIC ACTIVITIES: CPT | Mod: PO

## 2025-02-03 PROCEDURE — 97112 NEUROMUSCULAR REEDUCATION: CPT | Mod: PO

## 2025-02-03 NOTE — PROGRESS NOTES
[facility]   Physical Therapy Treatment Note      Name: Gualberto Brandon  Clinic Number: [id number]    Therapy Diagnosis:   Encounter Diagnosis   Name Primary?    Impaired functional mobility, balance, gait, and endurance Yes       Physician: Barbara Waldrop MD    Visit Date: 2/3/2025    Physician Orders: PT Eval and Treat   Medical Diagnosis from Referral: R26.9 (ICD-10-CM) - Abnormal gait   Evaluation Date: 12/13/2024  Authorization Period Expiration: 12/2/2024  Plan of Care Expiration: 2/7/205  Progress Note Due: 2/7/2025  Date of Surgery: NA2  Visit # / Visits authorized: 05/ 20  FOTO: 1/ 3     Precautions: Standard and Fall     Time In: 0800  Time Out: 0845  Total Billable Time: 45 minutes    PTA Visit #: 0/5       Subjective     Patient reports:no pain, nothing changed    He was compliant with HEP   Response to previous treatment:  felt ok   Functional change: on going    Pain: 0/10  Location: denies pain     Objective      Objective Measures updated at progress report unless specified.        Treatment     Gualberto received the treatments listed below:      therapeutic exercises to develop strength, endurance, and ROM for 10 minutes including:    10 minutes SCIFIT seated elliptical for CV endurance and LE strength, level 6.5      neuromuscular re-education activities to improve: Balance, Coordination, and Proprioception for 15  minutes. The following activities were included:      In // bars:   4 laps tandem ambulation, SBA   4 laps marching with three second holds, SBA    2 x 30 seconds tandem stance, CGA    2 x 30 seconds split stance on foam fitter with eyes closed, CGA       therapeutic activities to improve functional performance for 20 minutes, including:    In hallway while holding tidal tank:   2 x 100 feet ambulation, standby by assist    2 x 100 feet ambulation with horizontal head turns, standby by assist    2 x 100 feet ambulation with vertical head turns, standby  by assist      3 x 10 reps sit to stands from mat while holding tidal tank   - standing on foam fitter     10 reps BLE leading step up to sand dune, CGA    Time above includes time for frequent rest breaks     Patient Education and Home Exercises       Education provided:   - benefits of todays interventions, HEP    Written Home Exercises Provided: Yes. Exercises were reviewed and Gualberto was able to demonstrate them prior to the end of the session.  Gualberto demonstrated good  understanding of the education provided. See Electronic Medical Record under Patient Instructions for exercises provided during therapy sessions    Assessment     Mr. Brandon tolerated today's session fairly. Patient continues to exhibit self limiting behaviors throughout session, becoming frustrated with balance challenges. No seated rest breaks required and good endurance noted throughout session.  He remains appropriate for skilled Physical Therapy to address remaining functional mobility deficits.      Gualberto Is progressing well towards his goals.   Patient prognosis is Good.     Patient will continue to benefit from skilled outpatient physical therapy to address the deficits listed in the problem list box on initial evaluation, provide pt/family education and to maximize pt's level of independence in the home and community environment.     Patient's spiritual, cultural and educational needs considered and pt agreeable to plan of care and goals.     Anticipated barriers to physical therapy: co-morbidities, anxiety/ depression      Goals:  Short Term Goals: 4 weeks   1. Patient to be (I) with established home exercise program. MET 1/14/2025  2. Patient to improve bilateral hip extension strength by 1/3 MMT to improve balance and functional mobility.  MET 1/14/2025  3. Patient to improve 5 time sit to stand time to 10 seconds for improved functional mobility and strength.  MET 1/14/2025  4. Patient to improve GST condition 4 to 10 seconds  with BLE leading for improved stability with mobility. Ongoing  5. Patient to improve SLS time to 5 seconds for decreased risk for falls with community ambulation Ongoing        Long Term Goals: 8 weeks   1. Patient to be (I) with advanced home exercise program.Ongoing  2. Patient to improve bilateral hip extension strength by 2/3 MMT to improve balance and functional mobility. Ongoing  3. Patient to improve 5 time sit to stand time to 8 seconds for improved functional mobility and strength. Ongoing  4. Patient to improve GST condition 4 to 20 seconds with BLE leading for improved stability with mobility. Ongoing  5. Patient to improve SLS time to 10 seconds for decreased risk for falls with community ambulation Ongoing    Plan     Cont to progress functional mobility and gait     Rosi Cm, PT

## 2025-02-04 NOTE — PROGRESS NOTES
[facility]   Physical Therapy Treatment Note      Name: Gualberto Brandon  Clinic Number: [id number]    Therapy Diagnosis:   Encounter Diagnosis   Name Primary?    Impaired functional mobility, balance, gait, and endurance Yes         Physician: Barbara Waldrop MD    Visit Date: 2/5/2025    Physician Orders: PT Eval and Treat   Medical Diagnosis from Referral: R26.9 (ICD-10-CM) - Abnormal gait   Evaluation Date: 12/13/2024  Authorization Period Expiration: 12/2/2024  Plan of Care Expiration: 2/7/205  Progress Note Due: 2/7/2025  Date of Surgery: NA2  Visit # / Visits authorized: 06/ 20  FOTO: 1/ 3     Precautions: Standard and Fall     Time In: 0800  Time Out: 0845  Total Billable Time: 45 minutes    PTA Visit #: 0/5       Subjective     Patient reports:feels fine     He was compliant with HEP   Response to previous treatment:  felt ok   Functional change: on going    Pain: 0/10  Location: denies pain     Objective      Objective Measures updated at progress report unless specified.        Treatment     Gualberto received the treatments listed below:      therapeutic exercises to develop strength, endurance, and ROM for 10 minutes including:    10 minutes SCIFIT seated elliptical for CV endurance and LE strength, level 6.5      neuromuscular re-education activities to improve: Balance, Coordination, and Proprioception for 20  minutes. The following activities were included:      In // bars:   4 laps tandem ambulation, SBA   4 laps marching with three second holds, SBA    3 x 30 seconds static standing on sand dune with eyes closed, CGA     2 x 30 seconds split stance on foam fitter with eyes closed, CGA     2 x 10 reps standing on foam fitter with single large cone tapping       therapeutic activities to improve functional performance for 15 minutes, including:    In hallway while holding tidal tank:   2 x 100 feet ambulation, standby by assist    2 x 100 feet ambulation with horizontal  head turns, standby by assist    2 x 100 feet ambulation with vertical head turns, standby by assist      3 x 10 reps sit to stands from mat while holding tidal tank   - standing on foam fitter     10 reps BLE leading step up to sand dune, CGA    Time above includes time for frequent rest breaks     Patient Education and Home Exercises       Education provided:   - benefits of todays interventions, HEP    Written Home Exercises Provided: Yes. Exercises were reviewed and Gualberto was able to demonstrate them prior to the end of the session.  Gualberto demonstrated good  understanding of the education provided. See Electronic Medical Record under Patient Instructions for exercises provided during therapy sessions    Assessment     Mr. Brandon tolerated today's session fairly. Patient requires constant reassurance from therapist on his performance with balance activities and demonstrates negative self perception of his mobility throughout session with only mild challenges.  He remains appropriate for skilled Physical Therapy to address remaining functional mobility deficits.      Gualberto Is progressing well towards his goals.   Patient prognosis is Good.     Patient will continue to benefit from skilled outpatient physical therapy to address the deficits listed in the problem list box on initial evaluation, provide pt/family education and to maximize pt's level of independence in the home and community environment.     Patient's spiritual, cultural and educational needs considered and pt agreeable to plan of care and goals.     Anticipated barriers to physical therapy: co-morbidities, anxiety/ depression      Goals:  Short Term Goals: 4 weeks   1. Patient to be (I) with established home exercise program. MET 1/14/2025  2. Patient to improve bilateral hip extension strength by 1/3 MMT to improve balance and functional mobility.  MET 1/14/2025  3. Patient to improve 5 time sit to stand time to 10 seconds for improved  functional mobility and strength.  MET 1/14/2025  4. Patient to improve GST condition 4 to 10 seconds with BLE leading for improved stability with mobility. Ongoing  5. Patient to improve SLS time to 5 seconds for decreased risk for falls with community ambulation Ongoing        Long Term Goals: 8 weeks   1. Patient to be (I) with advanced home exercise program.Ongoing  2. Patient to improve bilateral hip extension strength by 2/3 MMT to improve balance and functional mobility. Ongoing  3. Patient to improve 5 time sit to stand time to 8 seconds for improved functional mobility and strength. Ongoing  4. Patient to improve GST condition 4 to 20 seconds with BLE leading for improved stability with mobility. Ongoing  5. Patient to improve SLS time to 10 seconds for decreased risk for falls with community ambulation Ongoing    Plan     Cont to progress functional mobility and gait     Rosi Cm, PT

## 2025-02-05 ENCOUNTER — CLINICAL SUPPORT (OUTPATIENT)
Dept: REHABILITATION | Facility: HOSPITAL | Age: 70
End: 2025-02-05
Payer: COMMERCIAL

## 2025-02-05 DIAGNOSIS — Z74.09 IMPAIRED FUNCTIONAL MOBILITY, BALANCE, GAIT, AND ENDURANCE: Primary | ICD-10-CM

## 2025-02-05 PROCEDURE — 97110 THERAPEUTIC EXERCISES: CPT | Mod: PO

## 2025-02-05 PROCEDURE — 97530 THERAPEUTIC ACTIVITIES: CPT | Mod: PO

## 2025-02-05 PROCEDURE — 97112 NEUROMUSCULAR REEDUCATION: CPT | Mod: PO

## 2025-02-07 ENCOUNTER — OFFICE VISIT (OUTPATIENT)
Dept: OPHTHALMOLOGY | Facility: CLINIC | Age: 70
End: 2025-02-07
Payer: COMMERCIAL

## 2025-02-07 DIAGNOSIS — H40.1133 PRIMARY OPEN ANGLE GLAUCOMA (POAG) OF BOTH EYES, SEVERE STAGE: Primary | ICD-10-CM

## 2025-02-07 PROCEDURE — 99999 PR PBB SHADOW E&M-EST. PATIENT-LVL IV: CPT | Mod: PBBFAC,,, | Performed by: STUDENT IN AN ORGANIZED HEALTH CARE EDUCATION/TRAINING PROGRAM

## 2025-02-07 PROCEDURE — 99024 POSTOP FOLLOW-UP VISIT: CPT | Mod: S$GLB,,, | Performed by: STUDENT IN AN ORGANIZED HEALTH CARE EDUCATION/TRAINING PROGRAM

## 2025-02-07 RX ORDER — BRIMONIDINE TARTRATE 2 MG/ML
1 SOLUTION/ DROPS OPHTHALMIC 2 TIMES DAILY
Qty: 10 ML | Refills: 11 | Status: SHIPPED | OUTPATIENT
Start: 2025-02-07

## 2025-02-07 NOTE — PROGRESS NOTES
HPI    DLS: 1/24/2025    Pt here for post  OD- 1/16/2025  Pt states no eye pain but is having some discomfort because of the crust   on eyelids. Pt is having a harder time to read.     Meds;  PF QID OD  Cosopt BID OU  Brimonidine BID OU  Rocklatan QHS OS  MZM 50mg TID PO      Last edited by Eileen Collier on 2/7/2025  8:39 AM.            Assessment /Plan     For exam results, see Encounter Report.    Primary open angle glaucoma (POAG) of both eyes, severe stage  -     brimonidine 0.2% (ALPHAGAN) 0.2 % Drop; Place 1 drop into both eyes 2 (two) times daily.  Dispense: 10 mL; Refill: 11      Dr Stanford x 30 years    Patient with sig other  Self employed : Financing // Loans    Advanced POAG   + APD OD    + Steroids to Knees --> discussed steroid response and hold until further notice    CCT  541 // 505    Mid -low teens --> not achieved and discussed options  --> meds & GDD    Both eyes --> tolerating well & good adherence --> CSM  Cosopt TID --> consider BID 2/2 [place]  Rocklatan q day  Brimonidine  Diamox 500 mg PO BID --> Holding  --> GI intolerant // diarrhea / dehydration --> improved  Neptazine 50 PO TID     Hydrus OU @ June 2024  ?? Patent OU    [facility] for LASER --> G-probe Dr Esparza @08/2024 12/19/2024  Pre-Tx 37  OD YAG disruption // puncture of Hydrus --> possible response 12/31/2024 --> 29    SP OD G-Probe CPC  Andrew Esparza at [facility] 08/29/2024  Transscleral Diode Laser Cyclophotocoagulation (CPC) PROCEDURE:  Retrobulbar block performed  The G-probe was applied to the sclera with the footplate of the probe tipe directed away from the cornea with the laser directed over the ciliary processes between 1 to 2 mm posterior to the limbus.  Diode laser settings: 1250 mW power, 4 sec duration  Treatment of the ciliary processes was performed 360 degrees: 24 shots in 4 quadrants (3' and 9' oclock meridians spared)    PC IOL OU  Quiet  Observe    12/05/2024  Color better than contour              Patient  with wife  Discussed options & used eye model with Ahmed &  R & B sx c Q & A  Further discussion with Dr Etienne 12/31/2024 02/07/2025  POW#3 s/p  (3 fenestrations) OD 1/16/25  IOP 23 on Cosopt 2/2, Brim 2/2, Rock 0/1, MZM 50 PO TID    Plan:  - Continue PF 4/0  - Continue Cosopt 2/2, Brim 2/2, Rock 0/1  - Continue MZM 50 mg PO TID    POW#5-6 -- VA, IOP, sooner PRN    Johana Etienne MD

## 2025-02-08 NOTE — PROGRESS NOTES
"  Outpatient Rehab    Physical Therapy Progress Note : Updated Plan of Care    Patient Name: Gualberto Brandon  MRN: [id number]  YOB: 1955  Today's Date: 2/10/2025    Therapy Diagnosis:   Encounter Diagnosis   Name Primary?    Impaired functional mobility, balance, gait, and endurance Yes     Physician: Barbara Waldrop MD    Physician Orders: Eval and Treat  Physician Orders: PT Eval and Treat   Medical Diagnosis from Referral: R26.9 (ICD-10-CM) - Abnormal gait   Evaluation Date: 12/13/2024  Authorization Period Expiration: 12/2/2024  Plan of Care Expiration: 3/3/2025  Progress Note Due: 3/3/2025  Date of Surgery: NA2  Visit # / Visits authorized: 06/ 20  FOTO: 1/ 3     Precautions: Standard and Fall     Time In: 0800   Time Out: 0845  Total Time: 45   Total Billable Time: 45 minutes          Subjective   History of Present Illness  Gualberto is a 69 y.o. male  According to the patient's chart, Gualberto has no past medical history on file. Gualberto has a past surgical history that includes Implantation of device for glaucoma (Right, 1/16/2025).                History of Present Condition/Illness: Continues to feel like he is off balance and unstable when moving around          Objective            Lower Extremity Strength  Right LE  Eval  2/10/2025 Left LE  Eval  2/10/2025   Hip Flexion: 4+/5 5/5 Hip Flexion: 4+/5 5/5   Hip Extension:  4/5 4+/5 Hip Extension: 4/5 4+/5   Hip Abduction: 5/5 5/5 Hip Abduction: 5/5 5/5   Hip Adduction: 5/5 5/5 Hip Adduction 5/5 5/5   Knee Extension: 5/5 5/5 Knee Extension: 5/5 5/5   Knee Flexion: 4+/5 5/5 Knee Flexion: 5/5 5/5   Ankle Dorsiflexion: 5/5 5/5 Ankle Dorsiflexion: 5/5 5/5   Ankle Plantarflexion: 5/5 5/5 Ankle Plantarflexion: 5/5 5/5        MARLIN SENSORY TESTING:  (P= Pass, F= Fail; note any sway; hold each position for 30")  Condition 1: (firm surface/feet together/eyes open) P 30 seconds   Condition 2: (firm surface/feet together/eyes closed) P 30 seconds "   Condition 3: (firm surface/feet in tandem/eyes open)27 seconds with LLE leading, 16 seconds with RLE   Condition 4: (firm surface/feet in tandem/eyes closed) F 12 seconds RLE leading 9 seconds with LLE leading   Condition 5: (soft surface/feet together/eyes open) P  Condition 6: (soft surface/feet together/eyes closed) P  Condition 7: (Fakuda step test), measure distance varied from center starting position NT           Evaluation 2/10/2025   Single Limb Stance R LE 2 seconds   (<10 sec = HIGH FALL RISK) 10 seconds    Single Limb Stance L LE 3 seconds   (<10 sec = HIGH FALL RISK) 22 seconds         Evaluation 2/10/2025   30 second Chair Rise  (adults > 59 y/o) 13 completed with no arms 18 completed    5 times sit-stand  (adults 18-63 y/o) 12 seconds  >12 sec= fall risk for general elderly  >16 sec= fall risk for Parkinson's disease  >10 sec= balance/vestibular dysfunction (<59 y/o)  >14.2 sec= balance/vestibular dysfunction (>59 y/o)  >12 sec= fall risk for CVA 08 seconds with no arms                   Intake Outcome Measure for FOTO Survey    Therapist reviewed FOTO scores for Gualberto Brandon on 2/10/2025.   FOTO report - see Media section or FOTO account episode details.     Intake Score:  %  Survey Score 1:  %  Survey Score 2:  %    Treatment:  Therapeutic Exercise  Therapeutic Exercise Activity 1: 10 minutes on SCIFIT seated elliptical level 6.5    Therapeutic Activity  Therapeutic Activity 1: time to perform and extensively discuss objective measures  Therapeutic Activity 2: discussion of upcoming discharge. Patient resistent on discharge but therapist explains the requirements for skilled physical therapy intevrention and the purpose of therapy being to educate and provide the tools for a patient to maintain mobility following discharge. Difference of personal training and physical therapy explained to the patient.    Patient's spiritual, cultural, and educational needs considered and patient  agreeable to plan of care and goals.     Assessment & Plan   Assessment  Gualberto                 Assessment Details: Gualberto has shown good improvements with therapy. Gualberto's biggest remaining barrier continues to be the anxiety related to health perception and mobility. Patient continues to demonstrate very poor self perception of his balance and mobility despite all of his objective scores falling above age related norms and placing him outside of the elevated falls risk category. Gualberto becomes extremely frustrated when any exercises are perceived as challenging, despite therapist constantly educating him that he has to be places in an unstable position in order to work on improving his balance and stability. Good BLE strength noted based on gualberto's MMT scores. The patient's SLS time places him outside of the elevated falls risk category and the patient's 30 second chair rise score indicates good BLE strength and endurance. The remaining two weeks of therapy will be focused on education to prepare the patient for upcoming discharge.     Plan  From a physical therapy perspective, the patient would benefit from: Skilled Rehab Services                Visit Frequency: 2 times Per Week for 3 Weeks.       This plan was discussed with Patient.   Discussion participants: Agreed Upon Plan of Care  Plan details: Focus on advanced home exercise program and preparing for upcoming discharge           Goals:   Active       Long term goals        independent with advanced home exercise program        Start:  02/10/25    Expected End:  03/03/25            . Patient to improve bilateral hip extension strength by 2/3 MMT        Start:  02/10/25    Expected End:  03/03/25            Patient to improve GST condition 4 to 20 seconds        Start:  02/10/25    Expected End:  03/03/25                Rosi Cm, PT

## 2025-02-10 ENCOUNTER — CLINICAL SUPPORT (OUTPATIENT)
Dept: REHABILITATION | Facility: HOSPITAL | Age: 70
End: 2025-02-10
Payer: COMMERCIAL

## 2025-02-10 DIAGNOSIS — Z74.09 IMPAIRED FUNCTIONAL MOBILITY, BALANCE, GAIT, AND ENDURANCE: Primary | ICD-10-CM

## 2025-02-10 PROCEDURE — 97530 THERAPEUTIC ACTIVITIES: CPT | Mod: PO

## 2025-02-10 PROCEDURE — 97110 THERAPEUTIC EXERCISES: CPT | Mod: PO

## 2025-02-11 NOTE — PROGRESS NOTES
"  Outpatient Rehab    Physical Therapy Visit    Patient Name: Gualberto Brandon  MRN: [id number]  YOB: 1955  Today's Date: 2/12/2025    Therapy Diagnosis:   Encounter Diagnosis   Name Primary?    Impaired functional mobility, balance, gait, and endurance Yes     Physician: Barbara Waldrop MD    Physician Orders: Eval and Treat  Physician Orders: PT Eval and Treat   Medical Diagnosis from Referral: R26.9 (ICD-10-CM) - Abnormal gait   Evaluation Date: 12/13/2024  Authorization Period Expiration: 12/2/2024  Plan of Care Expiration: 3/3/2025  Progress Note Due: 3/3/2025  Date of Surgery: NA2  Visit # / Visits authorized: 06/ 20  FOTO: 1/ 3     Precautions: Standard and Fall     Time In: 0800   Time Out: 0845  Total Time: 45   Total Billable Time: 45 minutes     FOTO:  Intake Score:  %  Survey Score 1:  %  Survey Score 2:  %         Subjective   "my anxiety is very bad today, but I know we have already talked about that".  Pain reported as 0/10.      Objective            Treatment:  Therapeutic Exercise  Therapeutic Exercise Activity 1: 10 minutes on SCIFIT seated elliptical level 6.5    Balance/Neuromuscular Re-Education  Balance/Neuromuscular Re-Education Activity 1: 30 seconds static standing on foam fitter , eyes open  Balance/Neuromuscular Re-Education Activity 2: 3 x 30 seconds static standing on foam fitter with eyes closed, CGA  Balance/Neuromuscular Re-Education Activity 3: 2 x 30 seconds BLE single leg stance, occ touchdown support - added to HEP    Therapeutic Activity  Therapeutic Activity 1: 2 x 10 reps sit to stands from mat while holding 2# weights and doing shoudler press/ bicep curl. - standing on foam pad  Therapeutic Activity 2: 2 x 10 reps step up to foam fitter with BLE leading, CGA, no UE support  Therapeutic Activity 3: 2 x 100 feet ambulation in hallway with horiztonal head turns holding tidal tank  Therapeutic Activity 4: 2 x 100 feet ambulation in hallway with vertical head " turns holding tidal tank  Therapeutic Activity 5: review of updated HEP    Assessment & Plan   Assessment: The patient tolerated session fairly well. He was provided advanced HEP for LE strength and endurance training today. Patient verbilizes/ demonstrates understanding of HEP. The patient would benefit from continued physical therapy intervention to address remaining functional mobility deficits.  Evaluation/Treatment Tolerance: Patient tolerated treatment well    Patient will continue to benefit from skilled outpatient physical therapy to address the deficits listed in the problem list box on initial evaluation, provide pt/family education and to maximize pt's level of independence in the home and community environment.     Patient's spiritual, cultural, and educational needs considered and patient agreeable to plan of care and goals.     Education  Education was done with Patient. The patient's learning style includes Demonstration. The patient Requires continuing/additional education.         Home exercise program, purpose of PT, discharge planning        Plan: continue to progress strength, endurance and balance training as tolerated; prepare the patient for discharge    Goals:   Active       Long term goals        independent with advanced home exercise program  (Progressing)       Start:  02/10/25    Expected End:  03/03/25            . Patient to improve bilateral hip extension strength by 2/3 MMT  (Progressing)       Start:  02/10/25    Expected End:  03/03/25            Patient to improve GST condition 4 to 20 seconds  (Progressing)       Start:  02/10/25    Expected End:  03/03/25                Rosi Cm, PT

## 2025-02-12 ENCOUNTER — CLINICAL SUPPORT (OUTPATIENT)
Dept: REHABILITATION | Facility: HOSPITAL | Age: 70
End: 2025-02-12
Payer: COMMERCIAL

## 2025-02-12 DIAGNOSIS — Z74.09 IMPAIRED FUNCTIONAL MOBILITY, BALANCE, GAIT, AND ENDURANCE: Primary | ICD-10-CM

## 2025-02-12 PROCEDURE — 97112 NEUROMUSCULAR REEDUCATION: CPT | Mod: PO

## 2025-02-12 PROCEDURE — 97530 THERAPEUTIC ACTIVITIES: CPT | Mod: PO

## 2025-02-12 PROCEDURE — 97110 THERAPEUTIC EXERCISES: CPT | Mod: PO

## 2025-02-17 ENCOUNTER — CLINICAL SUPPORT (OUTPATIENT)
Dept: REHABILITATION | Facility: HOSPITAL | Age: 70
End: 2025-02-17
Payer: COMMERCIAL

## 2025-02-17 DIAGNOSIS — Z74.09 IMPAIRED FUNCTIONAL MOBILITY, BALANCE, GAIT, AND ENDURANCE: Primary | ICD-10-CM

## 2025-02-17 PROCEDURE — 97110 THERAPEUTIC EXERCISES: CPT | Mod: PO

## 2025-02-17 PROCEDURE — 97112 NEUROMUSCULAR REEDUCATION: CPT | Mod: PO

## 2025-02-17 PROCEDURE — 97530 THERAPEUTIC ACTIVITIES: CPT | Mod: PO

## 2025-02-17 NOTE — PROGRESS NOTES
"  Outpatient Rehab    Physical Therapy Visit    Patient Name: Gualberto Brandon  MRN: [id number]  YOB: 1955  Today's Date: 2/17/2025    Therapy Diagnosis:   Encounter Diagnosis   Name Primary?    Impaired functional mobility, balance, gait, and endurance Yes     Physician: Barbara Waldrop MD      Physician Orders: PT Eval and Treat   Medical Diagnosis from Referral: R26.9 (ICD-10-CM) - Abnormal gait   Evaluation Date: 12/13/2024  Authorization Period Expiration: 12/2/2024  Plan of Care Expiration: 3/3/2025  Progress Note Due: 3/3/2025  Date of Surgery: NA2  Visit # / Visits authorized: 07/ 20  FOTO: 1/ 3     Precautions: Standard and Fall  Time In: 0800   Time Out: 0845  Total Time: 45   Total Billable Time: 45 minutes     FOTO:  Intake Score:  %  Survey Score 1:  %  Survey Score 2:  %         Subjective   "I had a bad weekend physically".  Pain reported as 0/10.      Objective            Treatment:  Therapeutic Exercise  Therapeutic Exercise Activity 1: 10 minutes on SCIFIT seated elliptical level 6.5    Balance/Neuromuscular Re-Education  Balance/Neuromuscular Re-Education Activity 1: 30 seconds static standing on foam fitter , eyes open  Balance/Neuromuscular Re-Education Activity 2: 3 x 30 seconds static standing on foam fitter with eyes closed, CGA  Balance/Neuromuscular Re-Education Activity 3: 4 laps tandem ambulation in // bars, CGA  Balance/Neuromuscular Re-Education Activity 4: 4 laps marching with three second holds  Balance/Neuromuscular Re-Education Activity 5: 2 x 10 reps double medium cone tapping  Balance/Neuromuscular Re-Education Activity 6: 2 x 30 seconds static split stance on foam fitter with eyes closed, CGA  Balance/Neuromuscular Re-Education Activity 7: 2 x 30 seconds vertical head turns on foam fitter, CGA  Balance/Neuromuscular Re-Education Activity 8: 2 x 30 seconds horizontal head turns on foam fitter, CGA    Therapeutic Activity  Therapeutic Activity 1: 2 x 10 reps sit " to stands from mat standing on foam pad  Therapeutic Activity 2: 2 x 10 reps step up to sand dune with BLE leading, CGA, no UE support  Therapeutic Activity 3: 2 x 100 feet ambulation in hallway with horiztonal head turns holding tidal tank  Therapeutic Activity 4: 2 x 100 feet ambulation in hallway with vertical head turns holding tidal tank  Therapeutic Activity 5: 2 x 100 feet ambulaiton with vertical self ball toss    Assessment & Plan   Assessment: The patient tolerated session fairly well. The patient requires decreased UE support throughout session and shows improved SLS balance. The patient would benefit from continued physical therapy intervention to address remaining functional mobility deficits.  Evaluation/Treatment Tolerance: Patient tolerated treatment well    Patient will continue to benefit from skilled outpatient physical therapy to address the deficits listed in the problem list box on initial evaluation, provide pt/family education and to maximize pt's level of independence in the home and community environment.     Patient's spiritual, cultural, and educational needs considered and patient agreeable to plan of care and goals.           Plan: continue to progress strength, endurance and balance training as tolerated; prepare the patient for discharge    Goals:   Active       Long term goals        independent with advanced home exercise program  (Progressing)       Start:  02/10/25    Expected End:  03/03/25            . Patient to improve bilateral hip extension strength by 2/3 MMT  (Progressing)       Start:  02/10/25    Expected End:  03/03/25            Patient to improve GST condition 4 to 20 seconds  (Progressing)       Start:  02/10/25    Expected End:  03/03/25                Rosi Cm, PT

## 2025-02-19 ENCOUNTER — CLINICAL SUPPORT (OUTPATIENT)
Dept: REHABILITATION | Facility: HOSPITAL | Age: 70
End: 2025-02-19
Payer: COMMERCIAL

## 2025-02-19 DIAGNOSIS — R26.9 ABNORMAL GAIT: ICD-10-CM

## 2025-02-19 DIAGNOSIS — Z74.09 IMPAIRED FUNCTIONAL MOBILITY, BALANCE, GAIT, AND ENDURANCE: Primary | ICD-10-CM

## 2025-02-19 PROCEDURE — 97112 NEUROMUSCULAR REEDUCATION: CPT | Mod: PO

## 2025-02-19 PROCEDURE — 97530 THERAPEUTIC ACTIVITIES: CPT | Mod: PO

## 2025-02-19 NOTE — PROGRESS NOTES
"  Outpatient Rehab    Physical Therapy Visit    Patient Name: Gualberto Brandon  MRN: [id number]  YOB: 1955  Today's Date: 2/19/2025    Therapy Diagnosis:   Encounter Diagnoses   Name Primary?    Impaired functional mobility, balance, gait, and endurance Yes    Abnormal gait        Physician: Barbara Waldrop MD    Physician Orders: Eval and Treat  Medical Diagnosis from Referral: R26.9 (ICD-10-CM) - Abnormal gait   Evaluation Date: 12/13/2024  Authorization Period Expiration: 12/2/2024  Plan of Care Expiration: 3/3/2025  Progress Note Due: 3/3/2025  Date of Surgery: NA2  Visit # / Visits authorized: 11/ 20  FOTO: 1/ 3     Precautions: Standard and Fall  Time In: 0800   Time Out: 0845  Total Time: 45   Total Billable Time: 45 minutes     FOTO:  Intake Score:  %  Survey Score 1:  %  Survey Score 2:  %         Subjective   "Chronic left hip and low back. Bilateral knee "tenderness". Been a while since is had injections"..         Objective            Treatment:  Therapeutic Exercise  Therapeutic Exercise Activity 1: 10 minutes on SCIFIT seated elliptical level 6.5    Balance/Neuromuscular Re-Education  Balance/Neuromuscular Re-Education Activity 1: 30 seconds static standing on foam fitter , eyes open  Balance/Neuromuscular Re-Education Activity 2: 3 x 30 seconds static standing on foam fitter with eyes closed, CGA  Balance/Neuromuscular Re-Education Activity 3: 4 laps tandem ambulation in // bars, CGA  Balance/Neuromuscular Re-Education Activity 4: 4 laps marching with three second holds  Balance/Neuromuscular Re-Education Activity 5: 2 x 10 reps double medium cone tapping  Balance/Neuromuscular Re-Education Activity 6: 2 x 30 seconds static split stance on foam fitter with eyes closed, CGA  Balance/Neuromuscular Re-Education Activity 7: 2 x 30 seconds vertical head turns on foam fitter, CGA  Balance/Neuromuscular Re-Education Activity 8: 2 x 30 seconds horizontal head turns on foam fitter, " CGA    Therapeutic Activity  Therapeutic Activity 1: 2 x 10 reps sit to stands from mat standing on foam pad  Therapeutic Activity 2: 2 x 10 reps step up to sand dune with BLE leading, CGA, no UE support  Therapeutic Activity 3: 2 x 100 feet ambulation in hallway with horiztonal head turns holding tidal tank  Therapeutic Activity 4: 2 x 100 feet ambulation in hallway with vertical head turns holding tidal tank  Therapeutic Activity 5: 2 x 100 feet ambulaiton with vertical self ball toss    Assessment & Plan   Assessment: The patient tolerated session fairly well. The patient requires decreased UE support throughout session and shows improved SLS balance. However, Left single leg stance is most challenging evonneley due to left hip and knee pain and instablity. The patient would benefit from continued physical therapy intervention to address remaining functional mobility deficits.  Evaluation/Treatment Tolerance: Patient tolerated treatment well    Patient will continue to benefit from skilled outpatient physical therapy to address the deficits listed in the problem list box on initial evaluation, provide pt/family education and to maximize pt's level of independence in the home and community environment.     Patient's spiritual, cultural, and educational needs considered and patient agreeable to plan of care and goals.           Plan: continue to progress strength, endurance and balance training as tolerated; prepare the patient for discharge    Goals:   Active       Long term goals        independent with advanced home exercise program  (Progressing)       Start:  02/10/25    Expected End:  03/03/25            . Patient to improve bilateral hip extension strength by 2/3 MMT  (Progressing)       Start:  02/10/25    Expected End:  03/03/25            Patient to improve GST condition 4 to 20 seconds  (Progressing)       Start:  02/10/25    Expected End:  03/03/25                Franklyn Wiggins PTA

## 2025-02-24 ENCOUNTER — HOSPITAL ENCOUNTER (EMERGENCY)
Facility: HOSPITAL | Age: 70
Discharge: HOME | End: 2025-02-24
Attending: EMERGENCY MEDICINE
Payer: COMMERCIAL

## 2025-02-24 VITALS
RESPIRATION RATE: 16 BRPM | HEART RATE: 80 BPM | TEMPERATURE: 98 F | BODY MASS INDEX: 29.59 KG/M2 | HEIGHT: 63 IN | DIASTOLIC BLOOD PRESSURE: 75 MMHG | WEIGHT: 167 LBS | SYSTOLIC BLOOD PRESSURE: 135 MMHG | OXYGEN SATURATION: 99 %

## 2025-02-24 DIAGNOSIS — S09.90XA CLOSED HEAD INJURY, INITIAL ENCOUNTER: ICD-10-CM

## 2025-02-24 DIAGNOSIS — S01.01XA SCALP LACERATION, INITIAL ENCOUNTER: Primary | ICD-10-CM

## 2025-02-24 PROCEDURE — 25000003 PHARM REV CODE 250: Performed by: EMERGENCY MEDICINE

## 2025-02-24 PROCEDURE — 99284 EMERGENCY DEPT VISIT MOD MDM: CPT | Mod: 25

## 2025-02-24 PROCEDURE — 12001 RPR S/N/AX/GEN/TRNK 2.5CM/<: CPT

## 2025-02-24 RX ADMIN — Medication: at 07:02

## 2025-02-24 NOTE — ED PROVIDER NOTES
Encounter Date: 2/24/2025       History     Chief Complaint   Patient presents with    Fall     Patient reports that he got out of bed, fell a little and hit his head on night stand. Laceration to the head. Takes baby aspirin. Denies LOC.      HPI  68 yo Gualberto Brandon presents to ED today for fall getting out of bed and subsequent head trauma on nightstand occurring at approximately 5:45a this morning. Pmhx is significant for balance disorder/abnormal gait being worked-up by neurology, glaucoma, bilateral lens replacement (2024). He is accompanied by his partner who estimates the bed is about 4ft tall with a 1ft step on the side. He denies any LOC, changes in vision from baseline, n/v or headache. Partner denies any notable changes in cognition from baseline since the fall, he does not appear confused.  No reported use of vitamin K antagonist, factor Xa inhibitors or any blood thinners.  He denies any gingival bleed, recurrent epistaxis, easy bruising, black or bloody stool or blood in urine.  Wife, at bedside assures/confirms patient behaving at baseline.    Review of patient's allergies indicates:  No Known Allergies  History reviewed. No pertinent past medical history.  Past Surgical History:   Procedure Laterality Date    IMPLANTATION OF DEVICE FOR GLAUCOMA Right 1/16/2025    Procedure: INSERTION, DEVICE, FOR GLAUCOMA;  Surgeon: Johana Etienne MD;  Location: [facility] OR [address] [place] [address];  Service: Ophthalmology;  Laterality: Right;   OD  Needs: Baerveldt 350 x2, CorneaGen x2     No family history on file.  Social History[1]  Review of Systems    Physical Exam     Initial Vitals [02/24/25 0551]   BP Pulse Resp Temp SpO2   130/77 80 18 98 °F (36.7 °C) 99 %      MAP       --         Physical Exam    Constitutional: No distress.   HENT:   Head: Normocephalic. Head is with laceration.       Eyes: EOM are normal. Pupils are equal, round, and reactive to light.   Neck:   Normal range of motion.  Cardiovascular:   Normal rate and regular rhythm.           Musculoskeletal:         General: No tenderness. Normal range of motion.      Right forearm: Normal. No deformity or tenderness.      Left forearm: Normal. No deformity or tenderness.      Cervical back: Normal range of motion. No spinous process tenderness or muscular tenderness.     Neurological: He is alert. GCS eye subscore is 4. GCS verbal subscore is 5. GCS motor subscore is 6.   Skin: Skin is warm and dry.         ED Course   Lac Repair    Date/Time: 2/24/2025 10:52 AM    Performed by: Ramon Wilkins MD  Authorized by: Ramon Wilkins MD    Consent:     Consent obtained:  Verbal    Consent given by:  Patient    Risks, benefits, and alternatives were discussed: yes      Risks discussed:  Infection and pain    Alternatives discussed:  No treatment and delayed treatment  Anesthesia:     Anesthesia method:  None  Laceration details:     Location:  Scalp    Scalp location:  L parietal    Length (cm):  2    Depth (mm):  2  Pre-procedure details:     Preparation:  Imaging obtained to evaluate for foreign bodies  Exploration:     Contaminated: no    Treatment:     Area cleansed with:  Saline    Amount of cleaning:  Standard    Irrigation solution:  Sterile saline    Irrigation volume:  500mL    Visualized foreign bodies/material removed: no      Debridement:  None    Undermining:  None    Scar revision: no    Skin repair:     Repair method:  Staples    Number of staples:  2  Approximation:     Approximation:  Close  Repair type:     Repair type:  Simple  Post-procedure details:     Dressing:  Antibiotic ointment    Procedure completion:  Tolerated    Labs Reviewed   HEPATITIS C ANTIBODY   HIV 1 / 2 ANTIBODY          Imaging Results              CT Head Without Contrast (Final result)  Result time 02/24/25 07:47:01      Final result by Jarvis Boyd MD (02/24/25 07:47:01)                   Impression:      No acute intracranial findings.      Electronically signed  by: Jarvis De Lunachristopher  Date:    02/24/2025  Time:    07:47               Narrative:    EXAMINATION:  CT HEAD WITHOUT CONTRAST    CLINICAL HISTORY:  Head trauma, minor (Age >= 65y);    TECHNIQUE:  Low dose axial images were obtained through the head.  Coronal and sagittal reformations were also performed. Contrast was not administered.    COMPARISON:  None.    FINDINGS:  Blood: No acute intracranial hemorrhage.    Parenchyma: No definite loss of gray-white differentiation to suggest acute or subacute transcortical infarct. Generalized pattern of age-related parenchymal volume loss.  Nonspecific areas of white matter hypoattenuation may reflect sequela of chronic small vessel ischemic disease.    Ventricles/Extra-axial spaces: No abnormal extra-axial fluid collection. Basal cisterns are patent.    Vessels: Moderate atherosclerotic calcifications.    Orbits: Status post bilateral lens replacements and placement of right-sided glaucoma drainage device.    Scalp: High right lateral scalp laceration status post repair.    Skull: There are no depressed skull fractures or destructive bone lesions.    Sinuses and mastoids: Essentially clear.    Other findings: None                                       Medications   Tdap vaccine injection 0.5 mL (0.5 mLs Intramuscular Not Given 2/24/25 4529)   LETS (LIDOcaine-TETRAcaine-EPINEPHrine) gel solution ( Topical (Top) Given 2/24/25 7336)     Medical Decision Making  Afebrile, hemodynamically stable neurovascularly intact male presents status post mechanical slip and contusion to left calvarium leading to 2 cm laceration onto nightstand.  No preceding symptoms.  No focal neurological symptoms or complaints that would indicate intracranial pressure pathology.  Laceration repaired with no complications.  CT given his age and closed head injury revealed no intracranial hemorrhage that may warrant emergent intervention at this time.    ____________________  Helio Wilkins MD,  [facility]  Emergency Medicine Staff  10:55 AM 2/24/2025      Amount and/or Complexity of Data Reviewed  Radiology: ordered.    Risk  Prescription drug management.                                      Clinical Impression:  Final diagnoses:  [S01.01XA] Scalp laceration, initial encounter (Primary)  [S09.90XA] Closed head injury, initial encounter          ED Disposition Condition    Discharge Stable          ED Prescriptions    None       Follow-up Information       Follow up With Specialties Details Why Contact Info    [facility] - Emergency Dept Emergency Medicine In 1 week Staple removal or any new concerns nausea, vomiting, worsening headache, numbness or weakness or changes in behavior [address] [facility] [id number] [phone]                 [1]   Social History  Tobacco Use    Smoking status: Unknown        Ramon Wilkins MD  02/24/25 4044

## 2025-02-24 NOTE — MEDICAL/APP STUDENT
"  History     Chief Complaint   Patient presents with    Fall     Patient reports that he got out of bed, fell a little and hit his head on night stand. Laceration to the head. Takes baby aspirin. Denies LOC.      HPI    History reviewed. No pertinent past medical history.    Past Surgical History:   Procedure Laterality Date    IMPLANTATION OF DEVICE FOR GLAUCOMA Right 1/16/2025    Procedure: INSERTION, DEVICE, FOR GLAUCOMA;  Surgeon: Johana Etienne MD;  Location: [facility] OR [address] [place] [address];  Service: Ophthalmology;  Laterality: Right;   OD  Needs: Baerveldt 350 x2, CorneaGen x2       No family history on file.    Social History[1]    Review of Systems    Physical Exam   /77   Pulse 80   Temp 98 °F (36.7 °C) (Oral)   Resp 18   Ht 5' 3" (1.6 m)   Wt 75.8 kg (167 lb)   SpO2 99%   BMI 29.58 kg/m²     Physical Exam    ED Course              [1]  Social History  Tobacco Use    Smoking status: Unknown   "

## 2025-02-24 NOTE — ED NOTES
Pt states he received Tdap vaccine 5 years ago for a trip to [place]. Pt refused extra dose of Tdap vaccine.

## 2025-02-24 NOTE — DISCHARGE INSTRUCTIONS
Imaging Results              CT Head Without Contrast (Final result)  Result time 02/24/25 07:47:01      Final result by Jarvis Boyd MD (02/24/25 07:47:01)                   Impression:      No acute intracranial findings.      Electronically signed by: Jarvis Boyd  Date:    02/24/2025  Time:    07:47               Narrative:    EXAMINATION:  CT HEAD WITHOUT CONTRAST    CLINICAL HISTORY:  Head trauma, minor (Age >= 65y);    TECHNIQUE:  Low dose axial images were obtained through the head.  Coronal and sagittal reformations were also performed. Contrast was not administered.    COMPARISON:  None.    FINDINGS:  Blood: No acute intracranial hemorrhage.    Parenchyma: No definite loss of gray-white differentiation to suggest acute or subacute transcortical infarct. Generalized pattern of age-related parenchymal volume loss.  Nonspecific areas of white matter hypoattenuation may reflect sequela of chronic small vessel ischemic disease.    Ventricles/Extra-axial spaces: No abnormal extra-axial fluid collection. Basal cisterns are patent.    Vessels: Moderate atherosclerotic calcifications.    Orbits: Status post bilateral lens replacements and placement of right-sided glaucoma drainage device.    Scalp: High right lateral scalp laceration status post repair.    Skull: There are no depressed skull fractures or destructive bone lesions.    Sinuses and mastoids: Essentially clear.    Other findings: None

## 2025-02-26 ENCOUNTER — OFFICE VISIT (OUTPATIENT)
Dept: OPHTHALMOLOGY | Facility: CLINIC | Age: 70
End: 2025-02-26
Payer: COMMERCIAL

## 2025-02-26 DIAGNOSIS — H40.1133 PRIMARY OPEN ANGLE GLAUCOMA (POAG) OF BOTH EYES, SEVERE STAGE: Primary | ICD-10-CM

## 2025-02-26 PROCEDURE — 99024 POSTOP FOLLOW-UP VISIT: CPT | Mod: S$GLB,,, | Performed by: STUDENT IN AN ORGANIZED HEALTH CARE EDUCATION/TRAINING PROGRAM

## 2025-02-26 PROCEDURE — 99999 PR PBB SHADOW E&M-EST. PATIENT-LVL IV: CPT | Mod: PBBFAC,,, | Performed by: STUDENT IN AN ORGANIZED HEALTH CARE EDUCATION/TRAINING PROGRAM

## 2025-02-26 NOTE — PATIENT INSTRUCTIONS
POST OP DROPS:    In the RIGHT eye:  Prednisolone Acetate (PINK or WHITE top)  SHAKE then place 1 drop 3x daily    GLAUCOMA DROPS:  Drug Eye Top Color Breakfast Lunch Dinner Bedtime   Alphagan:  Brimonidine LEFT Purple X  X    Cosopt:  Dorzolamide/  Timolol LEFT Blue X  X    Rocklatan LEFT White    X     STOP Methazolamide pills    WAIT 5 MINUTES BETWEEN DROPS. THE ORDER OF THE DROPS DOES NOT MATTER.    If you experience any increasing redness, sensitivity to light, pain, or changes in vision, please call the office immediately.    Johana Etienne MD  Office number: [phone]

## 2025-02-26 NOTE — PROGRESS NOTES
HPI    DLS: 2/7/25 w/ Dr. Etienne    S/p  OD- 1/16/2025    69 y.o. male presents for post op. Patient denies changes to VA, eye pain,   headaches, flashes, and floaters.    Meds;  PF QID OD  Cosopt BID OU  Brimonidine BID OU  Rocklatan QHS OS  MZM 50mg TID PO (usually BID)      Last edited by Makayla Cardenas on 2/26/2025  8:43 AM.            Assessment /Plan     For exam results, see Encounter Report.    Primary open angle glaucoma (POAG) of both eyes, severe stage        Dr Stanford x 30 years    Patient with sig other  Self employed : Financing // Loans    Advanced POAG   + APD OD    + Steroids to Knees --> discussed steroid response and hold until further notice    CCT  541 // 505    Mid -low teens --> not achieved and discussed options  --> meds & GDD    Both eyes --> tolerating well & good adherence --> CSM  Cosopt TID --> consider BID 2/2 [place]  Rocklatan q day  Brimonidine  Diamox 500 mg PO BID --> Holding  --> GI intolerant // diarrhea / dehydration --> improved  Neptazine 50 PO TID     Hydrus OU @ June 2024  ?? Patent OU    [facility] for [facility] --> G-probe Dr Esparza @08/2024 12/19/2024  Pre-Tx 37  OD YAG disruption // puncture of Hydrus --> possible response 12/31/2024 --> 29    SP OD G-Probe CPC  Andrew Esparza at [facility] 08/29/2024  Transscleral Diode Laser Cyclophotocoagulation (CPC) PROCEDURE:  Retrobulbar block performed  The G-probe was applied to the sclera with the footplate of the probe tipe directed away from the cornea with the laser directed over the ciliary processes between 1 to 2 mm posterior to the limbus.  Diode laser settings: 1250 mW power, 4 sec duration  Treatment of the ciliary processes was performed 360 degrees: 24 shots in 4 quadrants (3' and 9' oclock meridians spared)    PC IOL OU  Quiet  Observe    12/05/2024  Color better than contour              Patient with wife  Discussed options & used eye model with Ahmed &  R & B sx c Q & A  Further discussion with   Jaimie 12/31/2024 02/26/2025  POW#6 s/p  (3 fenestrations) OD 1/16/25, uneventful opening 2/26/25  IOP 3 on Cosopt 2/2, Brim 2/2, Rock 0/1, MZM 50 PO TID  AC deep, no choroidals, stop ocular hypotensives    Plan:  - Decrease to PF 3/0  - Change to Cosopt 0/2, Brim 0/2, Rock 0/1  - HOLD MZM 50 mg PO TID    1 week IOP    Johana Etienne MD

## 2025-03-04 ENCOUNTER — HOSPITAL ENCOUNTER (EMERGENCY)
Facility: HOSPITAL | Age: 70
Discharge: HOME | End: 2025-03-04
Attending: EMERGENCY MEDICINE
Payer: COMMERCIAL

## 2025-03-04 VITALS
HEART RATE: 67 BPM | OXYGEN SATURATION: 94 % | SYSTOLIC BLOOD PRESSURE: 131 MMHG | RESPIRATION RATE: 16 BRPM | TEMPERATURE: 98 F | BODY MASS INDEX: 29.58 KG/M2 | HEIGHT: 63 IN | DIASTOLIC BLOOD PRESSURE: 80 MMHG

## 2025-03-04 DIAGNOSIS — Z48.02 REMOVAL OF STAPLE: Primary | ICD-10-CM

## 2025-03-04 DIAGNOSIS — Z48.02: ICD-10-CM

## 2025-03-04 PROCEDURE — 99999 HC NO LEVEL OF SERVICE - ED ONLY: CPT

## 2025-03-04 NOTE — ED PROVIDER NOTES
Encounter Date: 3/4/2025       History     Chief Complaint   Patient presents with    Suture / Staple Removal     Staples placed last Tuesday and was told to come in to have them taken out       HPI patient was seen in the emergency department on 02/24/2025 after the patient sustained head trauma striking his head on his nightstand.  The patient had 2 staples placed at that time and was instructed to return to the emergency department for staple removal.  The patient has noted that the wound is healing well, no drainage, no redness surrounding the wound.  Review of patient's allergies indicates:  No Known Allergies  History reviewed. No pertinent past medical history.  Past Surgical History:   Procedure Laterality Date    IMPLANTATION OF DEVICE FOR GLAUCOMA Right 1/16/2025    Procedure: INSERTION, DEVICE, FOR GLAUCOMA;  Surgeon: Johana Etienne MD;  Location: [facility] OR [address] [place] [address];  Service: Ophthalmology;  Laterality: Right;   OD  Needs: Baerveldt 350 x2, CorneaGen x2     No family history on file.  Social History[1]      Physical Exam     Initial Vitals [03/04/25 1049]   BP Pulse Resp Temp SpO2   131/80 67 16 98.4 °F (36.9 °C) (!) 94 %      MAP       --         Physical Exam     Nursing note and vitals reviewed.  Constitutional: Patient appears well-developed and well-nourished. No distress.   HENT:   Head: L parietal scalp with 2 staples in place, well healed wound, no surrounding erythema   Eyes: Conjunctivae and EOM are normal. Pupils are equal, round, and reactive to light.   Neck: Neck supple.   Normal range of motion.  Pulmonary/Chest: No respiratory distress  Musculoskeletal:      Cervical back: Normal range of motion and neck supple.   Neurological: Patient is alert and oriented to person, place, and time. No cranial nerve deficit. Gait normal. GCS score is 15.    Skin: Skin is warm and dry.  Psych: Normal mood/affect    ED Course   Procedures  Labs Reviewed - No data to display         Imaging  Results    None          Medications - No data to display  Medical Decision Making                   Patient had staples x2 removed successfully in the emergency department without complication.  The patient and significant other were instructed on wound care and provided with strict return to ED precautions including increased redness, any drainage or any other concerns.                 Clinical Impression:  Final diagnoses:  [Z48.02] Removal of staple (Primary)  [Z48.02] Encounter for removal of staples          ED Disposition Condition    Discharge Stable          ED Prescriptions    None       Follow-up Information       Follow up With Specialties Details Why Contact Info    [facility] - Emergency Dept Emergency Medicine  As needed, If symptoms worsen [id number]  [facility] [id number] [phone]                 [1]   Social History  Tobacco Use    Smoking status: Unknown        Janneth Murdock MD  03/04/25 0510

## 2025-03-04 NOTE — ED NOTES
C/C: 69 y.o. male arrived to the ED via POV for staple removal. Patient denies any further complaints upon ED presentation.     APPEARANCE: awake and alert in NAD. Pain score 0/10.  SKIN: warm, dry and intact. No breakdown or bruising. 2 staples present to scalp on left parietal  MUSCULOSKELETAL: Patient moving all extremities spontaneously, no obvious swelling or deformities noted. Ambulates independently.  RESPIRATORY: Denies shortness of breath.Respirations unlabored.   CARDIAC: Denies CP, 2+ distal pulses; no peripheral edema  ABDOMEN: S/ND/NT, Denies nausea  : voids spontaneously, denies difficulty  NEUROLOGIC: AAO x 4; follows commands equal strength in all extremities; denies numbness/tingling. Denies dizziness, HA, or visual changes

## 2025-03-04 NOTE — DISCHARGE INSTRUCTIONS
You may wash your wound 1-2 times daily with gentle shampoo or soap and water    We will returned to the emergency department immediately if you notice redness or swelling surrounding your wound, pus draining from the wound or any other concerns.

## 2025-03-07 ENCOUNTER — OFFICE VISIT (OUTPATIENT)
Dept: OPHTHALMOLOGY | Facility: CLINIC | Age: 70
End: 2025-03-07
Payer: COMMERCIAL

## 2025-03-07 ENCOUNTER — TELEPHONE (OUTPATIENT)
Dept: OPHTHALMOLOGY | Facility: CLINIC | Age: 70
End: 2025-03-07

## 2025-03-07 DIAGNOSIS — Z96.1 PSEUDOPHAKIA OF BOTH EYES: ICD-10-CM

## 2025-03-07 DIAGNOSIS — H40.1133 PRIMARY OPEN ANGLE GLAUCOMA (POAG) OF BOTH EYES, SEVERE STAGE: Primary | ICD-10-CM

## 2025-03-07 DIAGNOSIS — H21.561 AFFERENT PUPILLARY DEFECT OF RIGHT EYE: ICD-10-CM

## 2025-03-07 PROCEDURE — 99999 PR PBB SHADOW E&M-EST. PATIENT-LVL IV: CPT | Mod: PBBFAC,,, | Performed by: STUDENT IN AN ORGANIZED HEALTH CARE EDUCATION/TRAINING PROGRAM

## 2025-03-07 RX ORDER — PREDNISOLONE ACETATE 10 MG/ML
1 SUSPENSION/ DROPS OPHTHALMIC
COMMUNITY
End: 2025-03-07 | Stop reason: SDUPTHER

## 2025-03-07 RX ORDER — PREDNISOLONE ACETATE 10 MG/ML
1 SUSPENSION/ DROPS OPHTHALMIC 2 TIMES DAILY
Qty: 10 ML | Refills: 0 | Status: SHIPPED | OUTPATIENT
Start: 2025-03-07

## 2025-03-07 NOTE — PROGRESS NOTES
HPI    DLS: 2/26/2025    Pt here for S/P  OD- 1/16/2025/ IOP Check;  Pt states no eye pain or discomfort and vision seems to be blurry OD.     Meds;  PF TID OD  Cosopt BID OS  Brimonidine BID OS  Rocklatan QDAY OS      Last edited by Eileen Collier on 3/7/2025  8:54 AM.            Assessment /Plan     For exam results, see Encounter Report.    Primary open angle glaucoma (POAG) of both eyes, severe stage  -     prednisoLONE acetate (PRED FORTE) 1 % DrpS; Place 1 drop into the right eye 2 (two) times daily. 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 Patent OU    [facility] for LASER --> G-probe Dr Esparza @08/2024 12/19/2024  Pre-Tx 37  OD YAG disruption // puncture of Hydrus --> possible response 12/31/2024 --> 29    SP OD G-Probe CPC  Andrew Esparza at [place] 08/29/2024  Transscleral Diode Laser Cyclophotocoagulation (CPC) PROCEDURE:  Retrobulbar block performed  The G-probe was applied to the sclera with the footplate of the probe tipe directed away from the cornea with the laser directed over the ciliary processes between 1 to 2 mm posterior to the limbus.  Diode laser settings: 1250 mW power, 4 sec duration  Treatment of the ciliary processes was performed 360 degrees: 24 shots in 4 quadrants (3' and 9' oclock meridians spared)    PC IOL OU  Quiet  Observe    12/05/2024  Color better than  contour              Patient with wife  Discussed options & used eye model with Ahmed &  R & B sx c Q & A  Further discussion with Dr Etienne 12/31/2024 03/07/2025  S/p  (3 fenestrations) OD 1/16/25, uneventful opening 2/26/25  IOP 10 on Cosopt 0/2, Brim 0/2, Rock 0/1  Excellent response to BGI, will plan same sx OS, patient and wife in agreement    I discussed the planned drainage device implant with Mr. Brandon. We discussed the rationale for the procedure and the risks involved.  I explained that surgery always carries risk, including but not limited to bleeding, infection, double vision, tube exposure, hypotony, corneal edema, ptosis (droopy eyelid), and vision loss. I noted that other complications may occur. I explained that whenever a person has incisional surgery on the eye, three bad things may happen: the eye may not see, the eye may hurt, and the eye may need another operation.  I also explained that anesthetics, including local anesthetics, carry a small risk of a severe or even fatal complication. I did my best to answer the his questions. Mr. Brandon  gave verbal and written consent to proceed with the surgery.     Plan:  - Taper PF 2-1 [id number] then stop  - Continue Cosopt 0/2, Brim 0/2, Rock 0/1  - HOLD MZM 50 mg PO TID    RTC OR -- ST  OS  Needs: Baerveldt 350 x2, CorneaGen x2     Johana Etienne MD

## 2025-03-10 ENCOUNTER — CLINICAL SUPPORT (OUTPATIENT)
Dept: REHABILITATION | Facility: HOSPITAL | Age: 70
End: 2025-03-10
Payer: COMMERCIAL

## 2025-03-10 DIAGNOSIS — Z74.09 IMPAIRED FUNCTIONAL MOBILITY, BALANCE, GAIT, AND ENDURANCE: Primary | ICD-10-CM

## 2025-03-10 PROCEDURE — 97530 THERAPEUTIC ACTIVITIES: CPT | Mod: PO

## 2025-03-10 NOTE — PROGRESS NOTES
"  Outpatient Rehab    Physical Therapy Discharge    Patient Name: Gualberto Brandon  MRN: [id number]  YOB: 1955  Encounter Date: 3/10/2025    Therapy Diagnosis:   Encounter Diagnosis   Name Primary?    Impaired functional mobility, balance, gait, and endurance Yes     Physician: Barbara Waldrop MD    Physician Orders: Eval and Treat  Medical Diagnosis from Referral: R26.9 (ICD-10-CM) - Abnormal gait   Evaluation Date: 12/13/2024  Authorization Period Expiration: 12/2/2024  Plan of Care Expiration: 3/3/2025  Progress Note Due: 3/3/2025  Date of Surgery: NA2  Visit # / Visits authorized: 12/ 20  FOTO: 1/ 3     Time In: 1300   Time Out: 1325  Total Time: 25   Total Billable Time: 25 minutes     FOTO:  Intake Score:  %  Survey Score 1:  %  Survey Score 2:  %         Subjective   "I just dont know how much progress I am really making anymore".  Pain reported as 0/10.      Objective          Lower Extremity Strength  Right LE  Eval  2/10/2025 3/10/2025 Left LE  Eval  3/10/2025   Hip Flexion: 4+/5 5/5 5/5 Hip Flexion: 4+/5 5/5   Hip Extension:  4/5 4+/5 4+/5 Hip Extension: 4/5 4+/5   Hip Abduction: 5/5 5/5 5/5 Hip Abduction: 5/5 5/5   Hip Adduction: 5/5 5/5 5/5 Hip Adduction 5/5 5/5   Knee Extension: 5/5 5/5 5/5 Knee Extension: 5/5 5/5   Knee Flexion: 4+/5 5/5 5/5 Knee Flexion: 5/5 5/5   Ankle Dorsiflexion: 5/5 5/5 5/5 Ankle Dorsiflexion: 5/5 5/5   Ankle Plantarflexion: 5/5 5/5 5/5 Ankle Plantarflexion: 5/5 5/5         MARLIN SENSORY TESTING:  (P= Pass, F= Fail; note any sway; hold each position for 30")  Condition 1: (firm surface/feet together/eyes open) P 30 seconds   Condition 2: (firm surface/feet together/eyes closed) P 30 seconds   Condition 3: (firm surface/feet in tandem/eyes open)P BLE leading  Condition 4: (firm surface/feet in tandem/eyes closed) P BLE leading   Condition 5: (soft surface/feet together/eyes open) P  Condition 6: (soft surface/feet together/eyes closed) P  Condition 7: (Fakuda " step test), measure distance varied from center starting position NT           Evaluation 2/10/2025 3/10/2025   Single Limb Stance R LE 2 seconds   (<10 sec = HIGH FALL RISK) 10 seconds  22 seconds    Single Limb Stance L LE 3 seconds   (<10 sec = HIGH FALL RISK) 22 seconds  8 seconds         Evaluation 2/10/2025 3/10/2025   30 second Chair Rise  (adults > 61 y/o) 13 completed with no arms 18 completed  20 completed with no arms    5 times sit-stand  (adults 18-63 y/o) 12 seconds  >12 sec= fall risk for general elderly  >16 sec= fall risk for Parkinson's disease  >10 sec= balance/vestibular dysfunction (<61 y/o)  >14.2 sec= balance/vestibular dysfunction (>61 y/o)  >12 sec= fall risk for CVA 08 seconds with no arms  06 seconds               Treatment:  Therapeutic Activity  Therapeutic Activity 1: time to perform objective measures  Therapeutic Activity 2: discuss discharge and the importance of joining a gym to maintain functional mobility    Assessment & Plan   Assessment: Gualberto has currently maximized the benefits of therapy and is ready for discharge to maintain mobility with fitness program/ joining a gym. Mirlande objective scores all fall within or above age related norms and do not place him in the elevated falls risk category. He remains significantly limited due to severe anxiety about his current health status/ motivation to participate in physical activity outside of therapy sessions. Extensive education was provided to the patient on the purpose of skilled therapy services and that he has reached his potential with therapy. Gualberto was provided multiple home exercise programs throughout course of therapy and was also provided print outs of various gym membership applications and personal training programs to better prepare him for discharge.  Evaluation/Treatment Tolerance: Patient tolerated treatment well    Patient's spiritual, cultural, and educational needs considered and patient agreeable to plan  of care and goals.           Plan:  Patient is appropriate for discharge from skilled therapy services at this time.     Goals:   Resolved       Long term goals        independent with advanced home exercise program  (Met)       Start:  02/10/25    Expected End:  03/03/25    Resolved:  03/10/25         . Patient to improve bilateral hip extension strength by 2/3 MMT  (Met)       Start:  02/10/25    Expected End:  03/03/25    Resolved:  03/10/25         Patient to improve GST condition 4 to 20 seconds  (Met)       Start:  02/10/25    Expected End:  03/03/25    Resolved:  03/10/25             Rosi Cm, PT

## 2025-04-08 ENCOUNTER — TELEPHONE (OUTPATIENT)
Dept: OPHTHALMOLOGY | Facility: CLINIC | Age: 70
End: 2025-04-08
Payer: COMMERCIAL

## 2025-04-08 NOTE — PRE-PROCEDURE INSTRUCTIONS
PreOp Instructions given:   - Verbal medication information (what to hold and what to take)   - NPO guidelines   - Arrival place directions given; time to be given the day before procedure by the   Surgeon's Office [address] [place] [address]  - Bathing with antibacterial soap   - Don't wear any jewelry or bring any valuables AM of surgery   - No makeup or moisturizer to face   - No perfume/cologne, powder, lotions or aftershave   Pt. verbalized understanding.   Pt denies any h/o Anesthesia/Sedation complications or side effects.

## 2025-04-09 ENCOUNTER — TELEPHONE (OUTPATIENT)
Dept: OPHTHALMOLOGY | Facility: CLINIC | Age: 70
End: 2025-04-09
Payer: COMMERCIAL

## 2025-04-09 NOTE — TELEPHONE ENCOUNTER
New arrival time changed to 10:30a for tomorrow with . patient understood. No questions were asked

## 2025-04-10 ENCOUNTER — ANESTHESIA (OUTPATIENT)
Dept: SURGERY | Facility: HOSPITAL | Age: 70
End: 2025-04-10
Payer: COMMERCIAL

## 2025-04-10 ENCOUNTER — HOSPITAL ENCOUNTER (OUTPATIENT)
Facility: HOSPITAL | Age: 70
Discharge: HOME | End: 2025-04-10
Attending: STUDENT IN AN ORGANIZED HEALTH CARE EDUCATION/TRAINING PROGRAM | Admitting: STUDENT IN AN ORGANIZED HEALTH CARE EDUCATION/TRAINING PROGRAM
Payer: COMMERCIAL

## 2025-04-10 ENCOUNTER — ANESTHESIA EVENT (OUTPATIENT)
Dept: SURGERY | Facility: HOSPITAL | Age: 70
End: 2025-04-10
Payer: COMMERCIAL

## 2025-04-10 VITALS
HEART RATE: 77 BPM | SYSTOLIC BLOOD PRESSURE: 113 MMHG | OXYGEN SATURATION: 97 % | TEMPERATURE: 97 F | BODY MASS INDEX: 28.7 KG/M2 | RESPIRATION RATE: 15 BRPM | DIASTOLIC BLOOD PRESSURE: 68 MMHG | WEIGHT: 162 LBS

## 2025-04-10 DIAGNOSIS — H40.9 GLAUCOMA, LEFT EYE: ICD-10-CM

## 2025-04-10 DIAGNOSIS — H40.1123 PRIMARY OPEN ANGLE GLAUCOMA (POAG) OF LEFT EYE, SEVERE STAGE: Primary | ICD-10-CM

## 2025-04-10 DIAGNOSIS — H40.1113 PRIMARY OPEN ANGLE GLAUCOMA (POAG) OF RIGHT EYE, SEVERE STAGE: ICD-10-CM

## 2025-04-10 PROCEDURE — 25000003 PHARM REV CODE 250: Performed by: NURSE ANESTHETIST, CERTIFIED REGISTERED

## 2025-04-10 PROCEDURE — 36000707: Performed by: STUDENT IN AN ORGANIZED HEALTH CARE EDUCATION/TRAINING PROGRAM

## 2025-04-10 PROCEDURE — C1783 OCULAR IMP, AQUEOUS DRAIN DE: HCPCS | Performed by: STUDENT IN AN ORGANIZED HEALTH CARE EDUCATION/TRAINING PROGRAM

## 2025-04-10 PROCEDURE — 36000706: Performed by: STUDENT IN AN ORGANIZED HEALTH CARE EDUCATION/TRAINING PROGRAM

## 2025-04-10 PROCEDURE — 25000003 PHARM REV CODE 250: Performed by: STUDENT IN AN ORGANIZED HEALTH CARE EDUCATION/TRAINING PROGRAM

## 2025-04-10 PROCEDURE — 63600175 PHARM REV CODE 636 W HCPCS: Performed by: NURSE ANESTHETIST, CERTIFIED REGISTERED

## 2025-04-10 PROCEDURE — 66180 AQUEOUS SHUNT EYE W/GRAFT: CPT | Mod: 79,LT,, | Performed by: STUDENT IN AN ORGANIZED HEALTH CARE EDUCATION/TRAINING PROGRAM

## 2025-04-10 PROCEDURE — 63600175 PHARM REV CODE 636 W HCPCS: Performed by: STUDENT IN AN ORGANIZED HEALTH CARE EDUCATION/TRAINING PROGRAM

## 2025-04-10 PROCEDURE — 25000003 PHARM REV CODE 250

## 2025-04-10 PROCEDURE — 71000044 HC DOSC ROUTINE RECOVERY FIRST HOUR: Performed by: STUDENT IN AN ORGANIZED HEALTH CARE EDUCATION/TRAINING PROGRAM

## 2025-04-10 PROCEDURE — D9220A PRA ANESTHESIA: Mod: CRNA,,, | Performed by: NURSE ANESTHETIST, CERTIFIED REGISTERED

## 2025-04-10 PROCEDURE — D9220A PRA ANESTHESIA: Mod: ANES,,, | Performed by: ANESTHESIOLOGY

## 2025-04-10 PROCEDURE — 71000015 HC POSTOP RECOV 1ST HR: Performed by: STUDENT IN AN ORGANIZED HEALTH CARE EDUCATION/TRAINING PROGRAM

## 2025-04-10 PROCEDURE — 37000009 HC ANESTHESIA EA ADD 15 MINS: Performed by: STUDENT IN AN ORGANIZED HEALTH CARE EDUCATION/TRAINING PROGRAM

## 2025-04-10 PROCEDURE — C1889 IMPLANT/INSERT DEVICE, NOC: HCPCS | Performed by: STUDENT IN AN ORGANIZED HEALTH CARE EDUCATION/TRAINING PROGRAM

## 2025-04-10 PROCEDURE — 37000008 HC ANESTHESIA 1ST 15 MINUTES: Performed by: STUDENT IN AN ORGANIZED HEALTH CARE EDUCATION/TRAINING PROGRAM

## 2025-04-10 DEVICE — GLAUCOMA IMPLANT BG101-350 GLOBAL
Type: IMPLANTABLE DEVICE | Site: EYE | Status: FUNCTIONAL
Brand: BAERVELDT GLAUCOMA IMPLANT (350)

## 2025-04-10 DEVICE — GRAFT VISIONGRAFT SPL HLF MOON: Type: IMPLANTABLE DEVICE | Site: EYE | Status: FUNCTIONAL

## 2025-04-10 RX ORDER — SODIUM CHLORIDE 0.9 % (FLUSH) 0.9 %
10 SYRINGE (ML) INJECTION
Status: DISCONTINUED | OUTPATIENT
Start: 2025-04-10 | End: 2025-04-10 | Stop reason: HOSPADM

## 2025-04-10 RX ORDER — ATROPINE SULFATE 10 MG/ML
SOLUTION/ DROPS OPHTHALMIC
Status: DISCONTINUED
Start: 2025-04-10 | End: 2025-04-10 | Stop reason: HOSPADM

## 2025-04-10 RX ORDER — FENTANYL CITRATE 50 UG/ML
25 INJECTION, SOLUTION INTRAMUSCULAR; INTRAVENOUS EVERY 5 MIN PRN
Refills: 0 | Status: DISCONTINUED | OUTPATIENT
Start: 2025-04-10 | End: 2025-04-10 | Stop reason: HOSPADM

## 2025-04-10 RX ORDER — ONDANSETRON HYDROCHLORIDE 2 MG/ML
INJECTION, SOLUTION INTRAVENOUS
Status: DISCONTINUED | OUTPATIENT
Start: 2025-04-10 | End: 2025-04-10

## 2025-04-10 RX ORDER — PROPOFOL 10 MG/ML
VIAL (ML) INTRAVENOUS
Status: DISCONTINUED | OUTPATIENT
Start: 2025-04-10 | End: 2025-04-10

## 2025-04-10 RX ORDER — MIDAZOLAM HYDROCHLORIDE 1 MG/ML
INJECTION INTRAMUSCULAR; INTRAVENOUS
Status: DISCONTINUED | OUTPATIENT
Start: 2025-04-10 | End: 2025-04-10

## 2025-04-10 RX ORDER — HALOPERIDOL LACTATE 5 MG/ML
0.5 INJECTION, SOLUTION INTRAMUSCULAR EVERY 10 MIN PRN
Status: DISCONTINUED | OUTPATIENT
Start: 2025-04-10 | End: 2025-04-10 | Stop reason: HOSPADM

## 2025-04-10 RX ORDER — DIPHENHYDRAMINE HYDROCHLORIDE 50 MG/ML
25 INJECTION, SOLUTION INTRAMUSCULAR; INTRAVENOUS EVERY 6 HOURS PRN
Status: DISCONTINUED | OUTPATIENT
Start: 2025-04-10 | End: 2025-04-10 | Stop reason: HOSPADM

## 2025-04-10 RX ORDER — LIDOCAINE HYDROCHLORIDE 40 MG/ML
INJECTION, SOLUTION RETROBULBAR
Status: DISCONTINUED
Start: 2025-04-10 | End: 2025-04-10 | Stop reason: HOSPADM

## 2025-04-10 RX ORDER — ROCURONIUM BROMIDE 10 MG/ML
INJECTION, SOLUTION INTRAVENOUS
Status: DISCONTINUED | OUTPATIENT
Start: 2025-04-10 | End: 2025-04-10

## 2025-04-10 RX ORDER — PHENYLEPHRINE HYDROCHLORIDE 10 MG/ML
INJECTION INTRAVENOUS
Status: DISCONTINUED | OUTPATIENT
Start: 2025-04-10 | End: 2025-04-10

## 2025-04-10 RX ORDER — GLUCAGON 1 MG
1 KIT INJECTION
Status: DISCONTINUED | OUTPATIENT
Start: 2025-04-10 | End: 2025-04-10 | Stop reason: HOSPADM

## 2025-04-10 RX ORDER — CEFAZOLIN SODIUM 500 MG/2.2ML
INJECTION, POWDER, FOR SOLUTION INTRAMUSCULAR; INTRAVENOUS
Status: DISCONTINUED
Start: 2025-04-10 | End: 2025-04-10 | Stop reason: HOSPADM

## 2025-04-10 RX ORDER — DEXAMETHASONE SODIUM PHOSPHATE 4 MG/ML
INJECTION, SOLUTION INTRA-ARTICULAR; INTRALESIONAL; INTRAMUSCULAR; INTRAVENOUS; SOFT TISSUE
Status: DISCONTINUED
Start: 2025-04-10 | End: 2025-04-10 | Stop reason: HOSPADM

## 2025-04-10 RX ORDER — PROPARACAINE HYDROCHLORIDE 5 MG/ML
1 SOLUTION/ DROPS OPHTHALMIC
Status: DISCONTINUED | OUTPATIENT
Start: 2025-04-10 | End: 2025-04-10 | Stop reason: HOSPADM

## 2025-04-10 RX ORDER — NOREPINEPHRINE BITARTRATE 1 MG/ML
INJECTION, SOLUTION INTRAVENOUS
Status: DISCONTINUED | OUTPATIENT
Start: 2025-04-10 | End: 2025-04-10

## 2025-04-10 RX ORDER — DEXAMETHASONE SODIUM PHOSPHATE 4 MG/ML
INJECTION, SOLUTION INTRA-ARTICULAR; INTRALESIONAL; INTRAMUSCULAR; INTRAVENOUS; SOFT TISSUE
Status: DISCONTINUED | OUTPATIENT
Start: 2025-04-10 | End: 2025-04-10 | Stop reason: HOSPADM

## 2025-04-10 RX ORDER — LIDOCAINE HYDROCHLORIDE 20 MG/ML
INJECTION INTRAVENOUS
Status: DISCONTINUED | OUTPATIENT
Start: 2025-04-10 | End: 2025-04-10

## 2025-04-10 RX ORDER — FENTANYL CITRATE 50 UG/ML
INJECTION, SOLUTION INTRAMUSCULAR; INTRAVENOUS
Status: DISCONTINUED | OUTPATIENT
Start: 2025-04-10 | End: 2025-04-10

## 2025-04-10 RX ORDER — VASOPRESSIN 20 [USP'U]/ML
INJECTION, SOLUTION INTRAMUSCULAR; SUBCUTANEOUS
Status: DISCONTINUED | OUTPATIENT
Start: 2025-04-10 | End: 2025-04-10

## 2025-04-10 RX ORDER — PREDNISOLONE ACETATE 10 MG/ML
SUSPENSION/ DROPS OPHTHALMIC
Status: DISCONTINUED
Start: 2025-04-10 | End: 2025-04-10 | Stop reason: WASHOUT

## 2025-04-10 RX ORDER — PROCHLORPERAZINE EDISYLATE 5 MG/ML
5 INJECTION INTRAMUSCULAR; INTRAVENOUS EVERY 30 MIN PRN
Status: DISCONTINUED | OUTPATIENT
Start: 2025-04-10 | End: 2025-04-10 | Stop reason: HOSPADM

## 2025-04-10 RX ORDER — DEXAMETHASONE SODIUM PHOSPHATE 4 MG/ML
INJECTION, SOLUTION INTRA-ARTICULAR; INTRALESIONAL; INTRAMUSCULAR; INTRAVENOUS; SOFT TISSUE
Status: DISCONTINUED | OUTPATIENT
Start: 2025-04-10 | End: 2025-04-10

## 2025-04-10 RX ORDER — CEFAZOLIN SODIUM 500 MG/2.2ML
INJECTION, POWDER, FOR SOLUTION INTRAMUSCULAR; INTRAVENOUS
Status: DISCONTINUED | OUTPATIENT
Start: 2025-04-10 | End: 2025-04-10 | Stop reason: HOSPADM

## 2025-04-10 RX ORDER — SODIUM CHLORIDE 0.9 % (FLUSH) 0.9 %
3 SYRINGE (ML) INJECTION
Status: DISCONTINUED | OUTPATIENT
Start: 2025-04-10 | End: 2025-04-10 | Stop reason: HOSPADM

## 2025-04-10 RX ORDER — PREDNISOLONE ACETATE 10 MG/ML
1 SUSPENSION/ DROPS OPHTHALMIC
Status: DISCONTINUED | OUTPATIENT
Start: 2025-04-10 | End: 2025-04-10 | Stop reason: HOSPADM

## 2025-04-10 RX ORDER — LIDOCAINE HYDROCHLORIDE 10 MG/ML
INJECTION, SOLUTION EPIDURAL; INFILTRATION; INTRACAUDAL; PERINEURAL
Status: DISCONTINUED
Start: 2025-04-10 | End: 2025-04-10 | Stop reason: WASHOUT

## 2025-04-10 RX ORDER — MOXIFLOXACIN 5 MG/ML
1 SOLUTION/ DROPS OPHTHALMIC
Status: DISCONTINUED | OUTPATIENT
Start: 2025-04-10 | End: 2025-04-10 | Stop reason: HOSPADM

## 2025-04-10 RX ORDER — NEOMYCIN SULFATE, POLYMYXIN B SULFATE, AND DEXAMETHASONE 3.5; 10000; 1 MG/G; [USP'U]/G; MG/G
OINTMENT OPHTHALMIC
Status: DISCONTINUED
Start: 2025-04-10 | End: 2025-04-10 | Stop reason: HOSPADM

## 2025-04-10 RX ADMIN — PROPOFOL 150 MG: 10 INJECTION, EMULSION INTRAVENOUS at 12:04

## 2025-04-10 RX ADMIN — ONDANSETRON 4 MG: 2 INJECTION INTRAMUSCULAR; INTRAVENOUS at 01:04

## 2025-04-10 RX ADMIN — SODIUM CHLORIDE, SODIUM LACTATE, POTASSIUM CHLORIDE, AND CALCIUM CHLORIDE: .6; .31; .03; .02 INJECTION, SOLUTION INTRAVENOUS at 01:04

## 2025-04-10 RX ADMIN — LIDOCAINE HYDROCHLORIDE 100 MG: 20 INJECTION INTRAVENOUS at 12:04

## 2025-04-10 RX ADMIN — SUGAMMADEX 200 MG: 100 INJECTION, SOLUTION INTRAVENOUS at 01:04

## 2025-04-10 RX ADMIN — NOREPINEPHRINE BITARTRATE 16 MCG: 1 INJECTION, SOLUTION, CONCENTRATE INTRAVENOUS at 01:04

## 2025-04-10 RX ADMIN — ROCURONIUM BROMIDE 50 MG: 10 INJECTION, SOLUTION INTRAVENOUS at 12:04

## 2025-04-10 RX ADMIN — PHENYLEPHRINE HYDROCHLORIDE 200 MCG: 10 INJECTION INTRAVENOUS at 02:04

## 2025-04-10 RX ADMIN — MOXIFLOXACIN OPHTHALMIC 1 DROP: 5 SOLUTION/ DROPS OPHTHALMIC at 11:04

## 2025-04-10 RX ADMIN — PROPARACAINE HYDROCHLORIDE 1 DROP: 5 SOLUTION/ DROPS OPHTHALMIC at 10:04

## 2025-04-10 RX ADMIN — NOREPINEPHRINE BITARTRATE 32 MCG: 1 INJECTION, SOLUTION, CONCENTRATE INTRAVENOUS at 01:04

## 2025-04-10 RX ADMIN — FENTANYL CITRATE 50 MCG: 50 INJECTION, SOLUTION INTRAMUSCULAR; INTRAVENOUS at 12:04

## 2025-04-10 RX ADMIN — GLYCOPYRROLATE 0.2 MG: 0.2 INJECTION, SOLUTION INTRAMUSCULAR; INTRAVENOUS at 01:04

## 2025-04-10 RX ADMIN — DEXAMETHASONE SODIUM PHOSPHATE 8 MG: 4 INJECTION, SOLUTION INTRAMUSCULAR; INTRAVENOUS at 01:04

## 2025-04-10 RX ADMIN — PHENYLEPHRINE HYDROCHLORIDE 200 MCG: 10 INJECTION INTRAVENOUS at 01:04

## 2025-04-10 RX ADMIN — VASOPRESSIN 2 UNITS: 20 INJECTION INTRAVENOUS at 01:04

## 2025-04-10 RX ADMIN — MIDAZOLAM HYDROCHLORIDE 2 MG: 2 INJECTION, SOLUTION INTRAMUSCULAR; INTRAVENOUS at 12:04

## 2025-04-10 RX ADMIN — MOXIFLOXACIN OPHTHALMIC 1 DROP: 5 SOLUTION/ DROPS OPHTHALMIC at 10:04

## 2025-04-10 RX ADMIN — PREDNISOLONE ACETATE 1 DROP: 10 SUSPENSION/ DROPS OPHTHALMIC at 10:04

## 2025-04-10 RX ADMIN — SODIUM CHLORIDE: 0.9 INJECTION, SOLUTION INTRAVENOUS at 12:04

## 2025-04-10 NOTE — TRANSFER OF CARE
Anesthesia Transfer of Care Note    Patient: Gualberto Brandon    Procedure(s) Performed: Procedure(s) (LRB):  INSERTION, AQUEOUS SHUNT, EYE (Left)    Patient location: PACU    Anesthesia Type: general    Transport from OR: Transported from OR on 6-10 L/min O2 by face mask with adequate spontaneous ventilation    Post pain: adequate analgesia    Post assessment: no apparent anesthetic complications    Post vital signs: stable    Level of consciousness: sedated    Nausea/Vomiting: no nausea/vomiting    Complications: none    Transfer of care protocol was followed      Last vitals: Visit Vitals  /76 (BP Location: Left arm, Patient Position: Lying)   Pulse 64   Temp 36.3 °C (97.4 °F) (Temporal)   Resp 18   Wt 73.5 kg (162 lb)   SpO2 96%   BMI 28.70 kg/m²

## 2025-04-10 NOTE — H&P
Expand All Collapse All    HPI    DLS: 2/26/2025     Pt here for S/P  OD- 1/16/2025/ IOP Check;  Pt states no eye pain or discomfort and vision seems to be blurry OD.      Meds;  PF TID OD  Cosopt BID OS  Brimonidine BID OS  Rocklatan QDAY OS        Last edited by Eileen Collier on 3/7/2025  8:54 AM.               Assessment  /  Plan  For exam results, see Encounter Report.     Primary open angle glaucoma (POAG) of both eyes, severe stage  -     prednisoLONE acetate (PRED FORTE) 1 % DrpS; Place 1 drop into the right eye 2 (two) times daily. 2 times a day for 2 weeks then 1 time a day for 2 weeks then stop  Dispense: 10 mL; Refill: 0              Dr Leader x 30 years     Patient with sig other  Self employed : Financing // Loans     Advanced POAG   + APD OD     + Steroids to Knees --> discussed steroid response and hold until further notice     CCT  541 // 505     Mid -low teens --> not achieved and discussed options  --> meds & GDD     Both eyes --> tolerating well & good adherence --> CSM  Cosopt TID --> consider BID 2/2 [place]  Rocklatan q day  Brimonidine  Diamox 500 mg PO BID --> Holding  --> GI intolerant // diarrhea / dehydration --> improved  Neptazine 50 PO TID      Hydrus OU @ June 2024  ?? Patent OU     [facility] for LASER --> G-probe Dr Esparza@08/2024 12/19/2024  Pre-Tx 37  OD YAG disruption // puncture of Hydrus --> possible response 12/31/2024 --> 29     SP OD G-Probe CPC     Andrew Esparza at [place]  08/29/2024  Transscleral Diode Laser Cyclophotocoagulation (CPC) PROCEDURE:  Retrobulbar block performed  The G-probe was applied to the sclera with the footplate of the probe tipe directed away from the cornea with the laser directed over the ciliary processes between 1 to 2 mm posterior to the limbus.  Diode laser settings: 1250 mW power, 4 sec duration  Treatment of the ciliary processes was performed 360 degrees: 24 shots in 4 quadrants (3' and 9' oclock meridians spared)     PC IOL  OU  Quiet  Observe     12/05/2024  Color better than contour                  Patient with wife  Discussed options & used eye model with Ahmed &  R & B sx c Q & A  Further discussion with Dr Etienne12/31/2024 03/07/2025  S/p  (3 fenestrations) OD 1/16/25, uneventful opening 2/26/25  IOP 10 on Cosopt 0/2, Brim 0/2, Rock 0/1  Excellent response to BGI, will plan same sx OS, patient and wife in agreement     I discussed the planned drainage device implant with Mr. Brandon. We discussed the rationale for the procedure and the risks involved.  I explained that surgery always carries risk, including but not limited to bleeding, infection, double vision, tube exposure, hypotony, corneal edema, ptosis (droopy eyelid), and vision loss. I noted that other complications may occur. I explained that whenever a person has incisional surgery on the eye, three bad things may happen: the eye may not see, the eye may hurt, and the eye may need another operation.  I also explained that anesthetics, including local anesthetics, carry a small risk of a severe or even fatal complication. I did my best to answer the his questions. Mr. Brandon  gave verbal and written consent to proceed with the surgery.     Plan:  - Taper PF 2-1 [id number] then stop  - Continue Cosopt 0/2, Brim 0/2, Rock 0/1  - HOLD MZM 50 mg PO TID     RTC OR -- ST  OS  Needs: Baerveldt 350 x2, CorneaGen x2      Johana Etienne MD

## 2025-04-10 NOTE — PLAN OF CARE
Patient tolerating oral liquids without difficulty. No apparent s&s of distress noted at this time, no complaints voiced at this time. Discharge instructions reviewed with patient and spouse with good verbal feedback received. IV removed. Patient ready for discharge.

## 2025-04-10 NOTE — OP NOTE
Gualberto Brandon  [id number]  4/10/2025    PREOPERATIVE DIAGNOSIS: Primary open angle glaucoma (POAG) of left eye, severe stage    POSTOPERATIVE DIAGNOSIS: Same as pre-operative diagnosis    OPERATION PERFORMED: Procedure(s):  INSERTION, AQUEOUS SHUNT, EYE (Baerveldt 350 with CorneaGen patch graft), LEFT eye    SURGEON: Primary: Johana Etienne MD    ANESTHESIA: MAC with block    COMPLICATIONS:  none    FINDINGS:  Anatomy as expected    ESTIMATED BLOOD LOSS:   minimal}    SPECIMENS:  * No specimens in log *    IMPLANTS:    Implant Name Type Inv. Item Serial No.  Lot No. LRB No. Used Action   IMPLANT BAERVELDT GLAUCOMA 350 - [id number]  IMPLANT BAERVELDT GLAUCOMA 350 [id number] [employer]  Left 1 Implanted   GRAFT VISIONGRAFT SPL HLF MOON - [id number]  GRAFT VISIONGRAFT SPL HLF LASSITER  CORNEA GEN  [id number] Left 1 Implanted     JUSTIFICATION OF SURGERY:  Gualberto Brandon is a 69 y.o. male with a history and physical exam consistent with uncontrolled intraocular pressure.  He has been treated with medical therapy, however the treatments were not sufficient.  We extensively discussed his medical conditions and multiple treatment options, including the risks, benefits, and alternatives of surgery.  He understands the potential for bleeding, infection, vision loss, and other related complications and recovery issues.  After answering all the questions, there was an understanding of the issues involved with surgery and the consent was signed.    PROCEDURE:  Betadine paint and drop in holding room  Prep and drape in usual manner in OR  Time out according to protocol   Superotemporal traction suture  Superotemporal fornix-based conjunctival flap  Bleeding controlled with cautery  Adequate pocket created using blunt and sharp dissection  Superior rectus and lateral rectus muscles isolated  Baerveldt implant inserted under the muscles  Implant  sutured onto the globe using 8-0 nylon sutures with the knot rotated into the eyelets of the implant  Tube ligated with 7-0 vicryl and tested for adequacy of ligature with balanced salt solution  Three  fenestrations made with a the spatula needle of the 7-0 vicryl suture  Tube trimmed to appropriate length with bevel away from the iris  23 gauge needle track made into the anterior chamber  Tube threaded into the eye  Tube secured to globe with an 7-0 vicryl suture  Corneal patch graft placed over the anterior portion of the tube and secured with 7-0 vicryl  Conjunctiva closed with 8-0 vicryl interrupted sutures  Inferotemporal paracentesis created  Subconjunctival injection of antibiotic and steroid  Removal of traction suture  Topical atropine drops  Topical antibiotic/steroid ointment  Dry sterile dressing    The patient tolerated the procedure well. There were no unexpected findings or complications.   no

## 2025-04-10 NOTE — ANESTHESIA PREPROCEDURE EVALUATION
[facility]  Anesthesia Pre-Operative Evaluation         Patient Name/: Gualberto Brandon, 1955  MRN: [id number]    SUBJECTIVE:     Pre-operative evaluation for Procedure(s) (LRB):  INSERTION, AQUEOUS SHUNT, EYE (Left)     04/10/2025    Gualberto Brandon is a 69 y.o. male     Patient now presents for the above procedure(s).    ________________________________________  No results found for this or any previous visit.    ________________________________________    LDA:        Peripheral IV - Single Lumen 04/10/25 1105 20 G Left;Posterior Hand (Active)   Site Assessment Clean;Dry;Intact 04/10/25 1109   Line Securement Device Secured with sutureless device 04/10/25 1109   Extremity Assessment Distal to IV No abnormal discoloration;No redness;No swelling 04/10/25 1109   Dressing Status Clean;Dry;Intact 04/10/25 110   Dressing Intervention First dressing 04/10/25 1109   Dressing Change Due 04/14/25 04/10/25 110   Site Change Due 04/14/25 04/10/25 110   Reason Not Rotated Anticipated discharge 04/10/25 110   Number of days: 0       Drips:       Problem List[1]    Review of patient's allergies indicates:  No Known Allergies    Current Inpatient Medications:    atropine 1%        carbachoL        ceFAZolin        dexAMETHasone        LIDOcaine (PF) 10 mg/ml (1%)        LIDOcaine (PF)        prednisoLONE acetate        sod hyaluronate-sod chondroitin-sod hyaluronate           Medications Ordered Prior to Encounter[2]    Past Surgical History:   Procedure Laterality Date    IMPLANTATION OF DEVICE FOR GLAUCOMA Right 2025    Procedure: INSERTION, DEVICE, FOR GLAUCOMA;  Surgeon: Johana Etienne MD;  Location: [facility] OR [address] [place] [address];  Service: Ophthalmology;  Laterality: Right;   OD  Needs: Baerveldt 350 x2, CorneaGen x2       Social History:  Tobacco Use: Low Risk  (1/10/2025)    Received from [facility]    Patient History     Smoking Tobacco Use: Never     Smokeless Tobacco Use:  Never     Passive Exposure: Not on file       Alcohol Use: Not At Risk (4/8/2025)    AUDIT-C     Frequency of Alcohol Consumption: Never     Average Number of Drinks: Patient does not drink     Frequency of Binge Drinking: Never       OBJECTIVE:     Vital Signs Range:  BMI Readings from Last 1 Encounters:   04/10/25 28.70 kg/m²       Temp:  [36.3 °C (97.4 °F)]   Pulse:  [64-65]   Resp:  [18]   BP: (125)/(76)   SpO2:  [96 %]        Significant Labs:        Component Value Date/Time     04/08/2025 0757    K 4.0 04/08/2025 0757    CO2 28 04/08/2025 0757    GLU 96 04/08/2025 0757    BUN 12 04/08/2025 0757    BUN 14.0 11/21/2023 1219    CREATININE 0.80 04/08/2025 0757    CALCIUM 9.7 04/08/2025 0757    ALBUMIN 4.2 04/08/2025 0757    ALKPHOS 72 04/08/2025 0757    BILITOT 1.4 (H) 04/08/2025 0757    AST 20 04/08/2025 0757    ALT 36 04/08/2025 0757    HGBA1C 5.3 04/08/2025 0757    HGBA1C 5.4 03/22/2023 0918        Please see Results Review for additional labs.     Diagnostic Studies: No relevant studies.    EKG:   No results found for this or any previous visit.    ECHO:  See subjective, if available.      ASSESSMENT/PLAN:                                                                                                                  04/10/2025  Gualberto Brandon is a 69 y.o., male.      Pre-op Assessment          Review of Systems         Anesthesia Plan  Type of Anesthesia, risks & benefits discussed:    Anesthesia Type: Gen ETT  Intra-op Monitoring Plan: Standard ASA Monitors  Induction:  IV  Informed Consent: Informed consent signed with the Patient and all parties understand the risks and agree with anesthesia plan.  All questions answered.   ASA Score: 2  Day of Surgery Review of History & Physical: H&P Update referred to the surgeon/provider.    Ready For Surgery From Anesthesia Perspective.     .           [1]   Patient Active Problem List  Diagnosis    Balance disorder    Abnormal gait    Primary open  angle glaucoma (POAG) of both eyes, severe stage    Pseudophakia of both eyes    Afferent pupillary defect of left eye    Impaired functional mobility, balance, gait, and endurance    Primary open angle glaucoma (POAG) of right eye, severe stage   [2]   No current facility-administered medications on file prior to encounter.     Current Outpatient Medications on File Prior to Encounter   Medication Sig Dispense Refill    ARIPiprazole (ABILIFY) 2 MG Tab 0.5 tablet each morning for five days then one tablet daily each morning 30 tablet 0    ARIPiprazole (ABILIFY) 5 MG Tab Take 1 tablet (5 mg total) by mouth once daily. 90 tablet 0    aspirin (ECOTRIN) 81 MG EC tablet Take 1 tablet by mouth once daily.      brimonidine 0.2% (ALPHAGAN) 0.2 % Drop Place 1 drop into both eyes 2 (two) times daily. 10 mL 11    cholecalciferol, vitamin D3, (VITAMIN D3) 50 mcg (2,000 unit) Cap capsule Take 2,000 Units by mouth once daily.      clonazePAM (KLONOPIN) 0.5 MG tablet Take up to 1.5 tablets by mouth daily as needed for anxiety 135 tablet 0    clonazePAM (KLONOPIN) 0.5 MG tablet 1.5 Tablet(s) orally 1 times a day as needed 135 tablet 0    dorzolamide-timolol 2-0.5% (COSOPT) 22.3-6.8 mg/mL ophthalmic solution Instill 1 drop into both eyes 3 times a day 10 mL 3    dorzolamide-timolol 2-0.5% (COSOPT) 22.3-6.8 mg/mL ophthalmic solution Place 1 drop into both eyes 3 (three) times daily. 10 mL 3    empagliflozin (JARDIANCE) 25 mg tablet Take 1 tablet by mouth daily 90 tablet 3    ferrous gluconate (FERGON) 324 MG tablet Take 1 tablet by mouth once daily.      lamoTRIgine (LAMICTAL) 100 MG tablet Take 1 tablet by mouth once a day 90 tablet 0    losartan (COZAAR) 100 MG tablet TAKE 1/2 TABLET BY MOUTH TWICE DAILY AS DIRECTED (Patient taking differently: Take 50 mg by mouth 2 (two) times a day.) 90 tablet 3    mercaptopurine (PURINETHOL) 50 mg tablet 1 1/2 TABLET BY MOUTH EVERY MORNING 180 tablet 5    metoprolol succinate (TOPROL-XL) 25 MG  24 hr tablet Take 1 tablet by mouth daily (Patient taking differently: Take 25 mg by mouth every evening.) 90 tablet 2    multivitamin (THERAGRAN) per tablet Take 1 tablet by mouth once daily.      netarsudiL-latanoprost (ROCKLATAN) 0.02-0.005 % Drop Instill 1 drop into both eyes as directed 30 mL 0    netarsudiL-latanoprost (ROCKLATAN) 0.02-0.005 % Drop Place 1 drop into both eyes nightly at bedtime as directed. 2.5 mL 11    netarsudiL-latanoprost (ROCKLATAN) 0.02-0.005 % Drop Place 1 drop into both eyes nightly at bedtime as directed. 2.5 mL 11    rosuvastatin (CRESTOR) 20 MG tablet Take 1 tablet (20 mg total) by mouth once daily. 90 tablet 3    semaglutide (OZEMPIC) 2 mg/dose (8 mg/3 mL) PnIj Inject 2 mg into the skin once a week 3 mL 2    traZODone (DESYREL) 100 MG tablet Take 1.5 tablets (150 mg total) by mouth nightly. 135 tablet 0    vilazodone (VIIBRYD) 20 mg Tab Take 1 tablet by mouth every night at bedtime 90 tablet 3    zolpidem (AMBIEN) 5 MG Tab Take 1 to 1 & 1/2 tablet by mouth once a day 135 tablet 0    amoxicillin (AMOXIL) 875 MG tablet Take 1 tablet by mouth 2 times per day for 10 days. Start day of surgery. (Patient not taking: Reported on 4/8/2025) 20 tablet 0    buPROPion (WELLBUTRIN SR) 150 MG TBSR 12 hr tablet take 1 tablet by mouth 1 time per day 90 tablet 0    buPROPion (WELLBUTRIN SR) 200 MG SR12 Take 1 tablet by mouth once daily (Patient not taking: Reported on 4/8/2025) 90 tablet 3    cephALEXin (KEFLEX) 500 MG capsule take 1 capsule by mouth twice daily (Patient not taking: Reported on 4/8/2025) 60 capsule 1    cephALEXin (KEFLEX) 500 MG capsule Take 1 capsule (500 mg total) by mouth 2 (two) times a day. (Patient not taking: Reported on 4/8/2025) 60 capsule 1    cephALEXin (KEFLEX) 500 MG capsule Take 1 capsule (500 mg total) by mouth 2 (two) times a day. (Patient not taking: Reported on 4/8/2025) 60 capsule 1    cephALEXin (KEFLEX) 500 MG capsule Take 1 capsule (500 mg total) by mouth 2  (two) times daily. (Patient not taking: Reported on 4/8/2025) 60 capsule 1    chlorhexidine (PERIDEX) 0.12 % solution Rinse with ½ to 1 ounce (15 to 30 mL) by mouth 2 times per day. Start day after surgery, then stop. (Patient not taking: Reported on 4/8/2025) 473 mL 0    dextroamphetamine-amphetamine (ADDERALL) 30 mg Tab 1 Tablet(s) by mouth 1 times a day (Patient not taking: Reported on 4/8/2025) 30 tablet 0    dextromethorphan-bupropion (AUVELITY)  mg TbIE TAKE 1 TABLET BY MOUTH TWICE A DAY 60 tablet 1    dorzolamide-timolol 2-0.5% (COSOPT) 22.3-6.8 mg/mL ophthalmic solution Place 1 drop into both eyes 3 (three) times daily. 10 mL 3    dorzolamide-timolol 2-0.5% (COSOPT) 22.3-6.8 mg/mL ophthalmic solution Place 1 drop into both eyes 2 (two) times a day. 10 mL 2    ibuprofen (ADVIL,MOTRIN) 800 MG tablet Take 1 tablet by mouth 3 times per day as needed for pain (Patient not taking: Reported on 4/8/2025) 16 tablet 0    lamoTRIgine (LAMICTAL) 200 MG tablet Take 2 tablets by mouth once daily 180 tablet 3    melatonin 10 mg Tab Take 10 mg by mouth nightly. (Patient not taking: Reported on 4/8/2025)      mercaptopurine (PURINETHOL) 50 mg tablet Take 1.5 tablets (75 mg total) by mouth every morning. 180 tablet 7    mercaptopurine (PURINETHOL) 50 mg tablet take 1 and 1/2 TABLET BY MOUTH EVERY MORNING 180 tablet 5    methazoLAMIDE (NEPTAZANE) 50 MG Tab Take 1 tablet (50 mg total) by mouth 3 (three) times daily. (Patient not taking: Reported on 4/8/2025) 90 tablet 3    netarsudiL-latanoprost (ROCKLATAN) 0.02-0.005 % Drop instill 1 drop into both eyes as directed 2.5 mL 4    peg3350-sod sul-NaCl-KCl-asb-C (PLENVU) 140-9-5.2 gram PPkS use as directed 3 packet 0    prednisoLONE acetate (PRED FORTE) 1 % DrpS Place 1 drop into the right eye 2 (two) times daily. 2 times a day for 2 weeks then 1 time a day for 2 weeks then stop 10 mL 0    psyllium husk, with sugar, (KONSYL, SUGAR,) 3.4 gram packet Take 1 packet by mouth 2  (two) times daily.      rosuvastatin (CRESTOR) 20 MG tablet Take 1 tablet by mouth daily 90 tablet 2    tadalafiL (CIALIS) 20 MG Tab Take 1 tablet by mouth as needed for erectile dysfunction (Patient taking differently: Take 20 mg by mouth daily as needed.) 18 tablet 10    tadalafiL (CIALIS) 20 MG Tab Take 1 tablet by mouth oas needed for erectile dysfunction (Patient not taking: Reported on 4/8/2025) 18 tablet 10    timoloL 0.25 % ophthalmic solution Place 1-2 drops into both eyes 2 (two) times a day. (Patient not taking: Reported on 3/7/2025)      topiramate (TOPAMAX) 200 MG Tab Take 2 tablets by mouth once daily (at bedtime) (Patient not taking: Reported on 4/8/2025) 180 tablet 3    traMADoL (ULTRAM) 50 mg tablet Take 1 tablet by mouth every 6 hours as needed for pain (Patient not taking: Reported on 4/8/2025) 12 tablet 0    traZODone (DESYREL) 100 MG tablet Take 1 and 1/2 tablet by mouth every night at bedtime (Patient not taking: Reported on 4/8/2025) 135 tablet 3    traZODone (DESYREL) 100 MG tablet Take 2 tablets (200 mg total) by mouth nightly. (Patient not taking: Reported on 4/8/2025) 180 tablet 2    vilazodone (VIIBRYD) 40 mg Tab tablet Take 1 tablet by mouth once a day (Patient taking differently: Take 40 mg by mouth nightly.) 90 tablet 0    zolpidem (AMBIEN) 5 MG Tab Take 1 and 1/2 tablets by mouth once daily. (Patient not taking: Reported on 4/8/2025) 135 tablet 1    zolpidem (AMBIEN) 5 MG Tab Take 1 tablet by mouth every night at bedtime (Patient not taking: Reported on 4/8/2025) 90 tablet 0    [DISCONTINUED] latanoprost 0.005 % ophthalmic solution Instill 1 drop into both eyes every evening 17.5 mL 0

## 2025-04-10 NOTE — ANESTHESIA PROCEDURE NOTES
Intubation    Date/Time: 4/10/2025 12:57 PM    Performed by: Raysa Newell CRNA  Authorized by: Tay Pinon MD    Intubation:     Induction:  Intravenous    Intubated:  Postinduction    Mask Ventilation:  Easy mask    Attempts:  1    Attempted By:  CRNA    Method of Intubation:  Video laryngoscopy    Blade:  Long 3    Laryngeal View Grade: Grade I - full view of cords      Difficult Airway Encountered?: No      Complications:  None    Airway Device:  Oral endotracheal tube    Airway Device Size:  7.5    Style/Cuff Inflation:  Cuffed (inflated to minimal occlusive pressure)    Inflation Amount (mL):  8    Tube secured:  23    Secured at:  The lips    Placement Verified By:  Capnometry and Revisualization with laryngoscopy    Complicating Factors:  None    Findings Post-Intubation:  BS equal bilateral and atraumatic/condition of teeth unchanged

## 2025-04-10 NOTE — DISCHARGE SUMMARY
[facility] (1st Fl)  Discharge Note  Short Stay    Procedure(s) (LRB):  INSERTION, AQUEOUS SHUNT, EYE (Left)    OUTCOME: Patient tolerated treatment/procedure well without complication and is now ready for discharge.    DISPOSITION: Home or Self Care    FINAL DIAGNOSIS:  Primary open angle glaucoma (POAG) of left eye, severe stage    FOLLOWUP: In clinic    DISCHARGE INSTRUCTIONS:  No discharge procedures on file.     TIME SPENT ON DISCHARGE: 5 minutes

## 2025-04-10 NOTE — DISCHARGE INSTRUCTIONS
POST OP DROPS:    Do not start any post operative drops in the operative eye today. Please keep the shield on at all times until you see Dr. Etienne.  Please bring all of your drops with you to your post-op appointment.    Take Tylenol as needed for headaches/pain  No heavy lifting, bending or straining for 10 days  Do not rub your eyes for 1 month  Do not get water in your eyes for 1 week  No swimming for 1 month  Please sleep with the shield over your eye for the next week to protect your eye during sleep    If you experience any increasing redness, sensitivity to light, pain, or changes in vision, please call the office immediately.    Johana Etienne MD  Office number: [phone]

## 2025-04-10 NOTE — INTERVAL H&P NOTE
BRIEF HISTORY, PERTINENT EXAM:  H&P reviewed. No changes.    ASSESSMENT/PLAN:  Proceed with surgery as planned -- Baerveldt 350 with CorneaGen patch graft, left eye    Johana Etienne MD

## 2025-04-11 ENCOUNTER — OFFICE VISIT (OUTPATIENT)
Dept: OPHTHALMOLOGY | Facility: CLINIC | Age: 70
End: 2025-04-11
Payer: COMMERCIAL

## 2025-04-11 DIAGNOSIS — H21.561 AFFERENT PUPILLARY DEFECT OF RIGHT EYE: ICD-10-CM

## 2025-04-11 DIAGNOSIS — H40.1133 PRIMARY OPEN ANGLE GLAUCOMA (POAG) OF BOTH EYES, SEVERE STAGE: Primary | ICD-10-CM

## 2025-04-11 DIAGNOSIS — Z96.1 PSEUDOPHAKIA OF BOTH EYES: ICD-10-CM

## 2025-04-11 PROCEDURE — 99999 PR PBB SHADOW E&M-EST. PATIENT-LVL II: CPT | Mod: PBBFAC,,, | Performed by: STUDENT IN AN ORGANIZED HEALTH CARE EDUCATION/TRAINING PROGRAM

## 2025-04-11 NOTE — PROGRESS NOTES
HPI    Here for one day post op for left eye. . No pain. Patch removed area   cleaned.  INSERTION, AQUEOUS SHUNT, EYE (Baerveldt 350 with CorneaGen patch graft),   LEFT eye  Eye meds: FOR OS                 ATRPINE                      moxi                     Pred          MAXITROL eddie QHS   Last edited by Victoria Campbell on 4/11/2025  9:47 AM.            Assessment /Plan     For exam results, see Encounter Report.    Primary open angle glaucoma (POAG) of both eyes, severe stage    Afferent pupillary defect of right eye    Pseudophakia of both eyes            Dr Stanford x 30 years    Patient with sig other  Self employed : Financing // Loans    Advanced POAG   + APD OD    + Steroids to Knees --> discussed steroid response and hold until further notice    CCT  541 // 505    Mid -low teens --> not achieved and discussed options  --> meds & GDD    Both eyes --> tolerating well & good adherence --> CSM  Cosopt TID --> consider BID 2/2 [place]  Rocklatan q day  Brimonidine  Diamox 500 mg PO BID --> Holding  --> GI intolerant // diarrhea / dehydration --> improved  Neptazine 50 PO TID     Hydrus OU @ June 2024  ?? Patent OU    [facility] for LASER --> G-probe Dr Esparza @08/2024 12/19/2024  Pre-Tx 37  OD YAG disruption // puncture of Hydrus --> possible response 12/31/2024 --> 29    SP OD G-Probe CPC  Andrew Esparza at [place] 08/29/2024  Transscleral Diode Laser Cyclophotocoagulation (CPC) PROCEDURE:  Retrobulbar block performed  The G-probe was applied to the sclera with the footplate of the probe tipe directed away from the cornea with the laser directed over the ciliary processes between 1 to 2 mm posterior to the limbus.  Diode laser settings: 1250 mW power, 4 sec duration  Treatment of the ciliary processes was performed 360 degrees: 24 shots in 4 quadrants (3' and 9' oclock meridians spared)    PC IOL OU  Quiet  Observe    12/05/2024  Color better than contour              Patient with wife  Discussed options & used eye  model with Ahmed &  R & B sx c Q & A  Further discussion with Dr Etienne 12/31/2024 04/11/2025  POD#1 s/p  (3 fenestrations) OS 4/10/25 [place]  S/p  (3 fenestrations) OD 1/16/25 [place], uneventful opening 2/26/25  IOP 10/18 today    Plan:  - Start PF 0/6, Moxi 0/4, Atropine 0/2  - Restart Cosopt 0/2, Brim 0/2  - HOLD Rock 0/1  - HOLD MZM 50 mg PO TID    RTC POW#1 -- VA, IOP, sooner PRN    Johana Etienne MD

## 2025-04-11 NOTE — PATIENT INSTRUCTIONS
POST OP DROPS:    In the LEFT eye:  Prednisolone Acetate (PINK or WHITE top)  SHAKE then place 1 drop 6x daily    Moxifloxacin (TAN top)  Place 1 drop 4 times daily for 7 days then stop (end date: 4/18/2025).    Atropine (RED top)  Place 1 drop 2 times daily    GLAUCOMA DROP INSTRUCTIONS  Drug Eye Top Color Breakfast Lunch Dinner Bedtime   Alphagan:  Brimonidine LEFT Purple X  X    Cosopt:  Dorzolamide/  Timolol LEFT Blue X  X      WAIT 5 MINUTES BETWEEN DROPS. THE ORDER OF THE DROPS DOES NOT MATTER.    No heavy lifting, bending or straining for 10 days  Do not rub your eyes for 1 month  Do not get water in your eyes for 1 week  No swimming for 1 month  Please sleep with the shield over your eye for the next week to protect your eye during sleep    If you experience any increasing redness, sensitivity to light, pain, or changes in vision, please call the office immediately.    Johana Etienne MD  Office number: [phone]

## 2025-04-12 ENCOUNTER — HOSPITAL ENCOUNTER (EMERGENCY)
Facility: HOSPITAL | Age: 70
Discharge: HOME | End: 2025-04-12
Attending: EMERGENCY MEDICINE
Payer: COMMERCIAL

## 2025-04-12 VITALS
RESPIRATION RATE: 16 BRPM | OXYGEN SATURATION: 97 % | SYSTOLIC BLOOD PRESSURE: 150 MMHG | DIASTOLIC BLOOD PRESSURE: 78 MMHG | HEART RATE: 70 BPM | BODY MASS INDEX: 29.23 KG/M2 | WEIGHT: 165 LBS | HEIGHT: 63 IN | TEMPERATURE: 98 F

## 2025-04-12 DIAGNOSIS — M54.50 ACUTE LEFT-SIDED LOW BACK PAIN WITHOUT SCIATICA: Primary | ICD-10-CM

## 2025-04-12 PROCEDURE — 63600175 PHARM REV CODE 636 W HCPCS: Mod: JZ,TB | Performed by: PHYSICIAN ASSISTANT

## 2025-04-12 PROCEDURE — 82962 GLUCOSE BLOOD TEST: CPT

## 2025-04-12 PROCEDURE — 96372 THER/PROPH/DIAG INJ SC/IM: CPT | Performed by: PHYSICIAN ASSISTANT

## 2025-04-12 PROCEDURE — 99284 EMERGENCY DEPT VISIT MOD MDM: CPT | Mod: 25

## 2025-04-12 PROCEDURE — 25000003 PHARM REV CODE 250: Performed by: PHYSICIAN ASSISTANT

## 2025-04-12 RX ORDER — LIDOCAINE 50 MG/G
1 PATCH TOPICAL
Status: DISCONTINUED | OUTPATIENT
Start: 2025-04-12 | End: 2025-04-13 | Stop reason: HOSPADM

## 2025-04-12 RX ORDER — KETOROLAC TROMETHAMINE 30 MG/ML
15 INJECTION, SOLUTION INTRAMUSCULAR; INTRAVENOUS
Status: COMPLETED | OUTPATIENT
Start: 2025-04-12 | End: 2025-04-12

## 2025-04-12 RX ORDER — LIDOCAINE 50 MG/G
1 PATCH TOPICAL DAILY
Qty: 15 PATCH | Refills: 0 | Status: SHIPPED | OUTPATIENT
Start: 2025-04-12

## 2025-04-12 RX ORDER — METHOCARBAMOL 750 MG/1
750 TABLET, FILM COATED ORAL 3 TIMES DAILY
Qty: 21 TABLET | Refills: 0 | Status: SHIPPED | OUTPATIENT
Start: 2025-04-12 | End: 2025-04-19

## 2025-04-12 RX ORDER — METHOCARBAMOL 750 MG/1
750 TABLET, FILM COATED ORAL
Status: COMPLETED | OUTPATIENT
Start: 2025-04-12 | End: 2025-04-12

## 2025-04-12 RX ORDER — NAPROXEN 500 MG/1
500 TABLET ORAL 2 TIMES DAILY WITH MEALS
Qty: 60 TABLET | Refills: 0 | Status: SHIPPED | OUTPATIENT
Start: 2025-04-12

## 2025-04-12 RX ADMIN — LIDOCAINE 1 PATCH: 50 PATCH CUTANEOUS at 10:04

## 2025-04-12 RX ADMIN — METHOCARBAMOL 750 MG: 750 TABLET ORAL at 10:04

## 2025-04-12 RX ADMIN — KETOROLAC TROMETHAMINE 15 MG: 30 INJECTION, SOLUTION INTRAMUSCULAR; INTRAVENOUS at 10:04

## 2025-04-13 LAB
OHS QRS DURATION: 88 MS
OHS QTC CALCULATION: 436 MS
POCT GLUCOSE: 103 MG/DL (ref 70–110)

## 2025-04-13 NOTE — ED NOTES
Gualberto Brandon, a 69 y.o. male presents to the ED w/ complaint of left sided back pain that started while having dinner suddenly.     Triage note:  Chief Complaint   Patient presents with    Back Pain     Pt arrive to ED with a complain of back pain 10/10.Pt reported while having dinner suddenly he starting to feel back pain, diaphoresis and clammy.Pt denies any SOB,chest pain.     Review of patient's allergies indicates:  No Known Allergies  History reviewed. No pertinent past medical history.

## 2025-04-13 NOTE — DISCHARGE INSTRUCTIONS
I recommend that you take Naproxen as prescribed for pain. Make sure to take with meals to prevent gastritis/stomach ulcers   You were also prescribed a muscle relaxer for back pain. This medication is sedating. Do not operate heavy machinery, drive or drink alcohol while taking this. You make take this with ibuprofen  Lidocaine patches prescribed for topical pain relief. You may use this with above medications   Follow up with orthopedics/spine clinic if your pain does not improve   Strict ED precautions given to return immediately for new, worsening, or concerning symptoms

## 2025-04-13 NOTE — ED PROVIDER NOTES
Encounter Date: 4/12/2025       History     Chief Complaint   Patient presents with    Back Pain     Pt arrive to ED with a complain of back pain 10/10.Pt reported while having dinner suddenly he starting to feel back pain, diaphoresis and clammy.Pt denies any SOB,chest pain.     69-year-old male with a PMHx of Glaucoma, chronic back pain, HTN, HLD, diverticulosis presents to ED with left lower back pain.  He was at [facility] quarter fest earlier today and felt mild left lower back pain.  Pain increased this afternoon. At that time he also felt nauseated.  History of lumbar spinal fusions in the past.  Lower back pain does not radiate.  Denies falls or trauma.  He denies paresthesias, saddle anesthesia, lower extremity weakness, dysuria, urinary frequency, hematuria, bowel changes, flank pain, abdominal pain, vomiting, fever, chest pain, shortness of breath.    The history is provided by the patient.     Review of patient's allergies indicates:  No Known Allergies  History reviewed. No pertinent past medical history.  Past Surgical History:   Procedure Laterality Date    IMPLANTATION OF DEVICE FOR GLAUCOMA Right 1/16/2025    Procedure: INSERTION, DEVICE, FOR GLAUCOMA;  Surgeon: Johana Etienne MD;  Location: [facility] OR [address] [place] [address];  Service: Ophthalmology;  Laterality: Right;   OD  Needs: Baerveldt 350 x2, CorneaGen x2    INSERTION, AQUEOUS SHUNT, EYE Left 4/10/2025    Procedure: INSERTION, AQUEOUS SHUNT, EYE;  Surgeon: Johana Etienne MD;  Location: [facility] OR [address] [place] [address];  Service: Ophthalmology;  Laterality: Left;  Ba[address], CorneaGen     No family history on file.  Social History[1]  Review of Systems    Physical Exam     Initial Vitals [04/12/25 2106]   BP Pulse Resp Temp SpO2   (!) 163/81 71 20 98.2 °F (36.8 °C) 98 %      MAP       --         Physical Exam    Nursing note and vitals reviewed.  Constitutional: He appears well-developed and well-nourished. He is not diaphoretic. No distress.   HENT:   Head:  Normocephalic and atraumatic.   Nose: Nose normal.   Eyes: EOM are normal. Left eye exhibits discharge. Left conjunctiva is injected.   Patient had left eye surgery for glaucoma this past week   Neck: Neck supple.   Cardiovascular:  Normal rate.           Pulses:       Dorsalis pedis pulses are 2+ on the right side and 2+ on the left side.   Pulmonary/Chest: No respiratory distress.   Abdominal:   Abdomen is soft, nontender, nondistended.  No CVA tenderness on left side   Musculoskeletal:      Cervical back: Neck supple.      Comments: Left lower back tenderness near SI joint.  No midline lumbar spinal tenderness     Neurological: He is alert and oriented to person, place, and time. He has normal strength. No sensory deficit. He exhibits normal muscle tone.   5/5 strength in bilateral lower extremities. Gait intact   Skin: No rash noted.   Psychiatric: He has a normal mood and affect. Thought content normal.         ED Course   Procedures  Labs Reviewed   HEPATITIS C ANTIBODY   HIV 1 / 2 ANTIBODY     EKG Readings: (Independently Interpreted)   Initial Reading: No STEMI. Rhythm: Normal Sinus Rhythm. Ectopy: PACs. Clinical Impression: Normal Sinus Rhythm with PACs       Imaging Results    None          Medications   LIDOcaine 5 % patch 1 patch (1 patch Transdermal Patch Applied 4/12/25 2215)   ketorolac injection 15 mg (15 mg Intramuscular Given 4/12/25 2215)   methocarbamoL tablet 750 mg (750 mg Oral Given 4/12/25 2215)     Medical Decision Making  69-year-old male with a PMHx of Glaucoma, chronic back pain, HTN, HLD, diverticulosis presents to ED with left lower back pain. Nontoxic appearing.  Vitals with hypertension.  Afebrile. Exam as above. I will initiate workup and reassess.    Ddx: Spondylosis, lumbar strain, disc herniation, spinal stenosis, cauda equina, obstructive uropathy or pyelonephritis     Patient's exam is reassuring today.  He is neurovascularly intact.  He has 5/5 strength in the bilateral lower  extremities.  His sensation is intact.  He is ambulatory without assistance.  He denies signs of cauda equina including saddle anesthesia, urinary or bowel changes, lower extremity weakness.  He appears in no acute distress.  He denies history of falls or trauma.  He does not have CVA tenderness or urinary symptoms concerning for obstructive uropathy or pyelo.  His symptoms improved after Toradol and Robaxin today.  I suspect he has a lower back strain.  However given history of chronic lower back pain I placed a referral with spine clinic in case symptoms do not improve. Strict ED precautions given to return immediately for new, worsening, or concerning symptoms          Risk  Prescription drug management.                                      Clinical Impression:  Final diagnoses:  [M54.50] Acute left-sided low back pain without sciatica (Primary)          ED Disposition Condition    Discharge Stable          ED Prescriptions       Medication Sig Dispense Start Date End Date Auth. Provider    naproxen (NAPROSYN) 500 MG tablet Take 1 tablet (500 mg total) by mouth 2 (two) times daily with meals. 60 tablet 4/12/2025 -- Sayda Burciaga PA-C    LIDOcaine (LIDODERM) 5 % Place 1 patch onto the skin once daily. Remove & Discard patch within 12 hours or as directed by MD 15 patch 4/12/2025 -- Sayda Burciaga PA-C    methocarbamoL (ROBAXIN) 750 MG Tab Take 1 tablet (750 mg total) by mouth 3 (three) times daily. for 7 days 21 tablet 4/12/2025 4/19/2025 Sayda Burciaga PA-C          Follow-up Information       Follow up With Specialties Details Why Contact Info Additional Information    [facility] [address] [facility] Schedule an appointment as soon as possible for a visit   [address]  [facility] [address] [phone] [facility] - Main Building, 5th Floor Please park in [facility] and use Atrium elevator    [facility] - Emergency Dept Emergency Medicine  If  symptoms worsen [address]  [facility] [address] [phone]                  [1]   Social History  Tobacco Use    Smoking status: Unknown        Sayda Burciaga PA-C  04/12/25 0846

## 2025-04-14 NOTE — ANESTHESIA POSTPROCEDURE EVALUATION
Anesthesia Post Evaluation    Patient: Gualberto Brandon    Procedure(s) Performed: Procedure(s) (LRB):  INSERTION, AQUEOUS SHUNT, EYE (Left)    Final Anesthesia Type: general      Patient location during evaluation: PACU  Patient participation: Yes- Able to Participate  Level of consciousness: awake and alert  Post-procedure vital signs: reviewed and stable  Pain management: adequate  Airway patency: patent    PONV status at discharge: No PONV  Anesthetic complications: no      Cardiovascular status: blood pressure returned to baseline  Respiratory status: unassisted  Hydration status: euvolemic  Follow-up not needed.              Vitals Value Taken Time   /73 04/10/25 15:15   Temp 36.3 °C (97.3 °F) 04/10/25 14:15   Pulse 76 04/10/25 15:15   Resp 16 04/10/25 15:15   SpO2 96 % 04/10/25 15:15         No case tracking events are documented in the log.      Pain/Farideh Score: No data recorded

## 2025-04-17 ENCOUNTER — OFFICE VISIT (OUTPATIENT)
Dept: OPHTHALMOLOGY | Facility: CLINIC | Age: 70
End: 2025-04-17
Payer: COMMERCIAL

## 2025-04-17 DIAGNOSIS — Z96.1 PSEUDOPHAKIA OF BOTH EYES: ICD-10-CM

## 2025-04-17 DIAGNOSIS — H21.561 AFFERENT PUPILLARY DEFECT OF RIGHT EYE: ICD-10-CM

## 2025-04-17 DIAGNOSIS — H40.1133 PRIMARY OPEN ANGLE GLAUCOMA (POAG) OF BOTH EYES, SEVERE STAGE: Primary | ICD-10-CM

## 2025-04-17 PROCEDURE — 99024 POSTOP FOLLOW-UP VISIT: CPT | Mod: S$GLB,,, | Performed by: STUDENT IN AN ORGANIZED HEALTH CARE EDUCATION/TRAINING PROGRAM

## 2025-04-17 PROCEDURE — 99999 PR PBB SHADOW E&M-EST. PATIENT-LVL III: CPT | Mod: PBBFAC,,, | Performed by: STUDENT IN AN ORGANIZED HEALTH CARE EDUCATION/TRAINING PROGRAM

## 2025-04-17 RX ORDER — METHAZOLAMIDE 50 MG/1
50 TABLET ORAL 3 TIMES DAILY
Qty: 90 TABLET | Refills: 0 | Status: SHIPPED | OUTPATIENT
Start: 2025-04-17 | End: 2025-05-17

## 2025-04-17 RX ORDER — BRIMONIDINE TARTRATE 2 MG/ML
1 SOLUTION/ DROPS OPHTHALMIC 2 TIMES DAILY
Qty: 10 ML | Refills: 11 | Status: SHIPPED | OUTPATIENT
Start: 2025-04-17

## 2025-04-17 RX ORDER — DORZOLAMIDE HYDROCHLORIDE AND TIMOLOL MALEATE 20; 5 MG/ML; MG/ML
1 SOLUTION/ DROPS OPHTHALMIC 2 TIMES DAILY
Qty: 10 ML | Refills: 3 | Status: SHIPPED | OUTPATIENT
Start: 2025-04-17

## 2025-04-17 RX ORDER — PREDNISOLONE ACETATE 10 MG/ML
1 SUSPENSION/ DROPS OPHTHALMIC 4 TIMES DAILY
Qty: 5 ML | Refills: 1 | Status: SHIPPED | OUTPATIENT
Start: 2025-04-17 | End: 2026-04-17

## 2025-04-17 NOTE — PROGRESS NOTES
HPI    NSERTION, AQUEOUS SHUNT, EYE (Baerveldt 350 with CorneaGen patch graft),   LEFT eye   Eye meds: FOR OS                  ATRPINE                      moxi                     Pred         MAXITROL eddie QHS     1 WEEK POST OP OS   Pt state difficult to read. Denies pain.     Last edited by Tj Pham on 4/17/2025  9:29 AM.            Assessment /Plan     For exam results, see Encounter Report.    Primary open angle glaucoma (POAG) of both eyes, severe stage    Afferent pupillary defect of right eye    Pseudophakia of both eyes    Other orders  -     prednisoLONE acetate (PRED FORTE) 1 % DrpS; Place 1 drop into the left eye 4 (four) times daily.  Dispense: 5 mL; Refill: 1  -     methazoLAMIDE (NEPTAZANE) 50 MG Tab; Take 1 tablet (50 mg total) by mouth 3 (three) times daily.  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 Patent OU    [facility] for LASER --> G-probe Dr Esparza @08/2024 12/19/2024  Pre-Tx 37  OD YAG disruption // puncture of Hydrus --> possible response 12/31/2024 --> 29    SP OD G-Probe CPC  Andrew Esparza at [place] 08/29/2024  Transscleral Diode Laser Cyclophotocoagulation (CPC) PROCEDURE:  Retrobulbar block performed  The G-probe was applied to the sclera with the footplate of the probe tipe directed away from the cornea with the laser directed over the ciliary processes between 1 to 2 mm posterior to the limbus.  Diode laser settings: 1250 mW  power, 4 sec duration  Treatment of the ciliary processes was performed 360 degrees: 24 shots in 4 quadrants (3' and 9' oclock meridians spared)    PC IOL OU  Quiet  Observe    12/05/2024  Color better than contour              Patient with wife  Discussed options & used eye model with Ahmed &  R & B sx c Q & A  Further discussion with Dr Etienne 12/31/2024 04/17/2025  POW#1 s/p  (3 fenestrations) OS 4/10/25 Jaimie  S/p  (3 fenestrations) OD 1/16/25 Jaimie, uneventful opening 2/26/25  IOP 10/25 today    Plan:  - Change to PF 0/4  - STOP Moxi 0/4, Atropine 0/2  - Continue Cosopt 0/2, Brim 0/2  - RESTART Rock 0/1  - RESTART MZM 50 mg PO TID    RTC 2 weeks -- [facility], [facility]  Dr. Bonilla POW6 for tube opening    Johana Etienne MD

## 2025-04-17 NOTE — PATIENT INSTRUCTIONS
POST OP DROPS:    In the LEFT eye:  Prednisolone Acetate (PINK or WHITE top)  SHAKE then place 1 drop 4x daily    STOP Moxifloxacin (TAN top) and Atropine (RED top)    GLAUCOMA DROPS/MEDS  Drug Eye Top Color Breakfast Lunch Dinner Bedtime   Alphagan:  Brimonidine LEFT Purple X  X    Cosopt:  Dorzolamide/  Timolol LEFT Blue X  X    Rocklatan LEFT White    X   Neptazane  (methazolamide) (pill)  50mg 50mg 50mg      - Always wait at least 5 minutes between drops to allow them to work.  - It is very important to take your eye drops every day as instructed, including the day of your next visit.  - Please remember to bring your eye drops to your next visit.  - If you run out of any of your drops before your next visit, please have the pharmacy call your doctor to authorize a refill.    Johana Etienne MD  Office number: [phone]

## 2025-04-29 ENCOUNTER — OFFICE VISIT (OUTPATIENT)
Dept: OPHTHALMOLOGY | Facility: CLINIC | Age: 70
End: 2025-04-29
Payer: COMMERCIAL

## 2025-04-29 DIAGNOSIS — Z96.1 PSEUDOPHAKIA OF BOTH EYES: ICD-10-CM

## 2025-04-29 DIAGNOSIS — H40.1133 PRIMARY OPEN ANGLE GLAUCOMA (POAG) OF BOTH EYES, SEVERE STAGE: Primary | ICD-10-CM

## 2025-04-29 DIAGNOSIS — H21.561 AFFERENT PUPILLARY DEFECT OF RIGHT EYE: ICD-10-CM

## 2025-04-29 PROCEDURE — 99024 POSTOP FOLLOW-UP VISIT: CPT | Mod: S$GLB,,, | Performed by: STUDENT IN AN ORGANIZED HEALTH CARE EDUCATION/TRAINING PROGRAM

## 2025-04-29 PROCEDURE — 99999 PR PBB SHADOW E&M-EST. PATIENT-LVL IV: CPT | Mod: PBBFAC,,, | Performed by: STUDENT IN AN ORGANIZED HEALTH CARE EDUCATION/TRAINING PROGRAM

## 2025-04-29 NOTE — PROGRESS NOTES
HPI    DLS: 4/17/25 w/ Dr. Etienne    69 y.o. male presents for 2 week follow up. Patient states he is unsure if   drops are getting into eyes, states he has a hard time with drops. Denies   eye pain, headaches, flashes, and floaters.     Meds:  MZM 50MG TID PO  PF 0/4  Cosopt 0/2  Brimonidine 0/2  Rocklatan 0/1    Last edited by Makayla Cardenas on 4/29/2025  2:24 PM.            Assessment /Plan     For exam results, see Encounter Report.    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 Patent OU    [facility] for LASER --> G-probe Dr Esparza @08/2024 12/19/2024  Pre-Tx 37  OD YAG disruption // puncture of Hydrus --> possible response 12/31/2024 --> 29    SP OD G-Probe CPC  Andrew Esparza at [place] 08/29/2024  Transscleral Diode Laser Cyclophotocoagulation (CPC) PROCEDURE:  Retrobulbar block performed  The G-probe was applied to the sclera with the footplate of the probe tipe directed away from the cornea with the laser directed over the ciliary processes between 1 to 2 mm posterior to the limbus.  Diode laser settings: 1250 mW power, 4 sec duration  Treatment of the ciliary processes was performed 360 degrees: 24 shots in 4 quadrants (3' and 9' oclock meridians spared)    PC IOL OU  Quiet  Observe    12/05/2024  Color better than contour              Patient with wife  Discussed  options & used eye model with Ahmed &  R & B sx c Q & A  Further discussion with Dr Etienne 12/31/2024 04/29/2025  POW#3 s/p  (3 fenestrations) OS 4/10/25 [place]on  S/p  (3 fenestrations) OD 1/16/25 [place]on, uneventful opening 2/26/25  IOP 8/19 today on Cos 0/2, Brim 0/2, Rock 0/1, MZM    Plan:  - Continue PF 0/4  - Continue Cosopt 0/2, Brim 0/2, Rock 0/1, MZM 50 mg PO TID    RTC 2-3 weeks POW6 Dr. Bonilla - tube opening    Johana Etienne MD

## 2025-05-05 ENCOUNTER — OFFICE VISIT (OUTPATIENT)
Facility: CLINIC | Age: 70
End: 2025-05-05
Payer: COMMERCIAL

## 2025-05-05 ENCOUNTER — LAB VISIT (OUTPATIENT)
Dept: LAB | Facility: HOSPITAL | Age: 70
End: 2025-05-05
Payer: COMMERCIAL

## 2025-05-05 VITALS
SYSTOLIC BLOOD PRESSURE: 100 MMHG | HEIGHT: 63 IN | BODY MASS INDEX: 29.23 KG/M2 | HEART RATE: 59 BPM | WEIGHT: 165 LBS | DIASTOLIC BLOOD PRESSURE: 70 MMHG

## 2025-05-05 DIAGNOSIS — R26.9 ABNORMAL GAIT: Primary | ICD-10-CM

## 2025-05-05 DIAGNOSIS — R41.3 MEMORY CHANGES: ICD-10-CM

## 2025-05-05 DIAGNOSIS — G21.19 DRUG-INDUCED PARKINSONISM: ICD-10-CM

## 2025-05-05 DIAGNOSIS — R26.9 ABNORMAL GAIT: ICD-10-CM

## 2025-05-05 PROCEDURE — 99215 OFFICE O/P EST HI 40 MIN: CPT | Mod: S$GLB,,, | Performed by: STUDENT IN AN ORGANIZED HEALTH CARE EDUCATION/TRAINING PROGRAM

## 2025-05-05 PROCEDURE — G2211 COMPLEX E/M VISIT ADD ON: HCPCS | Mod: S$GLB,,, | Performed by: STUDENT IN AN ORGANIZED HEALTH CARE EDUCATION/TRAINING PROGRAM

## 2025-05-05 PROCEDURE — 99999 PR PBB SHADOW E&M-EST. PATIENT-LVL V: CPT | Mod: PBBFAC,,, | Performed by: STUDENT IN AN ORGANIZED HEALTH CARE EDUCATION/TRAINING PROGRAM

## 2025-05-05 PROCEDURE — 83884 ASSAY NEURFLMNT LIGHT CHAIN: CPT

## 2025-05-05 PROCEDURE — 36415 COLL VENOUS BLD VENIPUNCTURE: CPT

## 2025-05-05 PROCEDURE — 83520 IMMUNOASSAY QUANT NOS NONAB: CPT

## 2025-05-05 NOTE — PATIENT INSTRUCTIONS
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     Let me know about physical therapy.

## 2025-05-05 NOTE — PROGRESS NOTES
Name: Gualberto Brandon  MRN: [id number]   CSN: [id number]      Date: 05/06/2025    Referring physician:  No referring provider defined for this encounter.    Chief Complaint / Interval History: Poor Balance    Prior Neuro Consult 5/2023 at [facility]  Patient with family history of dementia, who presents with a 6-month history of intermittent orthostatic complaints associated with a sense of increasing imbalance, which have improved over the past several weeks.  There are no associated cognitive complaints.  Structural imaging is reviewed and is notable for age-related atrophy with commensurate expansion of the lateral ventricles.  There are no features concerning for normal pressure hydrocephalus.     The cause of intermittent orthostatic symptoms and imbalance remains unclear, although a neurodegenerative etiology is unlikely.  Rather, I suspect that use of cognitive/balance impairing medications (Ambien, clonazepam, Viibryd, Adderall) may all contribute to symptoms and to orthostatic complaints.  The patient does have a history of cervical fusion and lumbar diskectomy.  Cord compression provides an alternate explanation for bowel incontinence, which requires reassessment via neuroimaging.      History of Present Illness (HPI):  Mr. Brandon is a 68 yo LH man who presents for abnormal balance. His symptoms began acutely in early 2023. He describes waking up and feeling that the room was spinning which caused a fall. His balance has been poor (but fluctuating) since then. Again 6-8 months later he had an episode of room spinning but was then able to play pickle ball that same day without issue. He has had a handful of falls due to his imbalance. He also feels he sometimes struggles with steps. Unclear how much his vision is contributing as he has severe glaucoma (R>>L). He denies any sensory changes in his feet. He has not done in PT. He denies ETOH use. Polypharmacy likely contributing. Feels that his symptoms  may be exacerbated by Ozempic. Has never seen ENT. He did have a period of time where fecal incontinence was an issue. He feels this was related to a glaucoma medication that has side effect of diarrhea and has improved. There was no evidence of cord compression on spinal imaging. He was also worked up for NPH however this was thought to be less likely.     Interval History:  Mr. Brandon presents today for follow-up.   He is concerned for parkinsonism.   More deliberate in his walking, slower with his walking. Not rapidly progressing but is progressing some.   Last fall was a few weeks ago.  ENT felt  central vestibular abnormality with SWJ.  PT wasn't very helpful in his experience.   More shuffled steps since starting abilify for his mood symptoms. He does not feel that abilify has been helpful for his mood. .    Prior Work-up:    MRI Brain 4/2023  1. Ventriculomegaly is presumed due to central involutional changes. Otherwise, no acute intracranial process is identified.   2. Mild supratentorial and minimal pontine white matter microvascular ischemic changes.   3. Mild scattered chronic sinus disease.         MRI Cervical/Thoracic 4/2023  CERVICAL: Stable appearance of C4-C6 ACDF with interbody fusion. Evaluation of the cervical spinal   cord is unremarkable for intrinsic signal abnormality. Mild broad-based disc osteophyte complex   eccentric to the right C3-4 with moderate right foraminal narrowing. Bilateral uncovertebral   spurring C6-C7, left greater than right with moderate left and mild right foraminal narrowing. No   evidence of alan disc herniation or significant central spinal canal narrowing.       THORACIC: Stable curvature of the lower thoracic spine, apex at T9. Evaluation of the thoracic   spinal cord is unremarkable for intrinsic signal abnormality. Multilevel disc bulging and   uncovertebral spurring. Left-sided uncovertebral spurring T6-7 causes moderate left foraminal   narrowing. Right  posterior lateral disc/uncinate spur T9-10 flattens the right ventral thecal sac   and causes moderate right foraminal narrowing. Right posterior lateral disc/uncovertebral spurring   T10-11 with right-sided facet degenerative changes contributes to mild-moderate central spinal canal   narrowing and moderate to severe right foraminal narrowing. On sagittal views, there is severe disc   degeneration with Modic type I changes at L1-2 with probable severe left foraminal narrowing   although previously enlarged disc herniation posterior to L1 on 2019 examination of October 17 is no   longer visualized. This could be further evaluated with dedicated MR imaging of the lumbar spine if   clinically indicated.     Current Mvmt Medications:  Abilify 5mg daily (added in Dec)  Vilazidone 40mg nightlly   Effexor -- not taking this YET  Lamictal 100mg   Clonazepam 0.5mg nightly   Acetazolamide 500mg ER BID ?? TID     Prior Mvmt Medication Trials:  GBP 300mg BID  Burproprion 400 --> 150mg  TPM  Ozempic -- symptom onset around this time. Worsens with each dosage increase?      Nonmotor ROS:  Smell/Taste: slightly poor but has been this way forever   Voice/Swallowing: voice is a bit softer, now swallowing issues   Gait/Falls: the balance fluctuates - has fallen 2-3 times since onset   Dizziness:  at least two episodes of vertigo like dizziness - this is not consistent   Hydration: could do better   Urinary Issues: none - no incontinence   Constipation: none    - fecal incontinence a few times - no longer   Sleep/RBD: 2x/year where he has active dreams - has RLS - none   Hallucinations/Peripheral Illusions: sometimes has peripheral illusions more recently   Memory/Cognition/Language: doing ok   Mood: very anxious     Past Medical History: Glaucoma, Anxiety, Depression, Spinal stenosis     Relevant Surgical History:   Past Surgical History:   Procedure Laterality Date    IMPLANTATION OF DEVICE FOR GLAUCOMA Right 1/16/2025     "Procedure: INSERTION, DEVICE, FOR GLAUCOMA;  Surgeon: Johana Etienne MD;  Location: [facility] OR [address] [place] [address];  Service: Ophthalmology;  Laterality: Right;   OD  Needs: [address] x2, CorneaGen x2    INSERTION, AQUEOUS SHUNT, EYE Left 4/10/2025    Procedure: INSERTION, AQUEOUS SHUNT, EYE;  Surgeon: Johana Etienne MD;  Location: [facility] OR [address] [place] [address];  Service: Ophthalmology;  Laterality: Left;  [address], CorneaGen       Social History: The patient denies ETOH, drug use or tobacco.     Family History: Their family history is not on file.  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      His mother  at age 77 (kidney disease). His father  with symptoms of dementia at age 83 (AAO = 81). He is the youngest of 4 sisters. His oldest sister was evaluated for "severe memory problems" that were treated via ECT. She is now  (age 82) with a diagnosis of dementia/depression.   - children are healthy      Allergies: Patient has no known allergies.     Meds:   Current Outpatient Medications on File Prior to Visit   Medication Sig Dispense Refill    ARIPiprazole (ABILIFY) 2 MG Tab 0.5 tablet each morning for five days then one tablet daily each morning 30 tablet 0    ARIPiprazole (ABILIFY) 5 MG Tab Take 1 tablet (5 mg total) by mouth once daily. 90 tablet 0    aspirin (ECOTRIN) 81 MG EC tablet Take 1 tablet by mouth once daily.      brimonidine 0.2% (ALPHAGAN) 0.2 % Drop Place 1 drop into the left eye 2 (two) times daily. 10 mL 11    chlorhexidine (PERIDEX) 0.12 % solution Rinse mouth with ½ to 1 ounce  twice daily. Start Day after surgery for 7 days, then stop 473 mL 0    cholecalciferol, vitamin D3, (VITAMIN D3) 50 mcg (2,000 unit) Cap capsule Take 2,000 Units by mouth once daily.      clonazePAM (KLONOPIN) 0.5 MG tablet Take up to 1.5 tablets by mouth daily as needed for anxiety 135 tablet 0    clonazePAM (KLONOPIN) 0.5 MG tablet 1.5 Tablet(s) orally 1 times a day as " needed 135 tablet 0    dorzolamide-timolol 2-0.5% (COSOPT) 22.3-6.8 mg/mL ophthalmic solution Place 1 drop into the left eye 2 (two) times daily. 10 mL 3    empagliflozin (JARDIANCE) 25 mg tablet Take 1 tablet by mouth daily 90 tablet 3    ferrous gluconate (FERGON) 324 MG tablet Take 1 tablet by mouth once daily.      lamoTRIgine (LAMICTAL) 100 MG tablet Take 1 tablet by mouth once a day 90 tablet 0    lamoTRIgine (LAMICTAL) 200 MG tablet Take 2 tablets by mouth once daily 180 tablet 3    LIDOcaine (LIDODERM) 5 % Place 1 patch onto the skin once daily. Remove & Discard patch within 12 hours or as directed by MD 15 patch 0    losartan (COZAAR) 100 MG tablet TAKE 1/2 TABLET BY MOUTH TWICE DAILY AS DIRECTED (Patient taking differently: Take 50 mg by mouth 2 (two) times a day.) 90 tablet 3    melatonin 10 mg Tab Take 10 mg by mouth nightly. (Patient not taking: Reported on 4/8/2025)      mercaptopurine (PURINETHOL) 50 mg tablet Take 1.5 tablets (75 mg total) by mouth every morning. 180 tablet 7    mercaptopurine (PURINETHOL) 50 mg tablet 1 1/2 TABLET BY MOUTH EVERY MORNING 180 tablet 5    mercaptopurine (PURINETHOL) 50 mg tablet take 1 and 1/2 TABLET BY MOUTH EVERY MORNING 180 tablet 5    methazoLAMIDE (NEPTAZANE) 50 MG Tab Take 1 tablet (50 mg total) by mouth 3 (three) times daily. 90 tablet 0    metoprolol succinate (TOPROL-XL) 25 MG 24 hr tablet Take 1 tablet by mouth daily (Patient taking differently: Take 25 mg by mouth every evening.) 90 tablet 2    metoprolol succinate (TOPROL-XL) 25 MG 24 hr tablet Take 1 tablet (25 mg total) by mouth once daily. 90 tablet 3    metoprolol succinate (TOPROL-XL) 25 MG 24 hr tablet Take 1 tablet by mouth daily 90 tablet 2    multivitamin (THERAGRAN) per tablet Take 1 tablet by mouth once daily.      naproxen (NAPROSYN) 500 MG tablet Take 1 tablet (500 mg total) by mouth 2 (two) times daily with meals. 60 tablet 0    netarsudiL-latanoprost 0.02-0.005 % Drop Place 1 drop into both  eyes every evening. 2.5 mL 11    [id number] sul-NaCl-KCl-asb-C (PLENVU) 140-9-5.2 gram PPkS use as directed 3 packet 0    prednisoLONE acetate (PRED FORTE) 1 % DrpS Place 1 drop into the left eye 4 (four) times daily. 5 mL 1    psyllium husk, with sugar, (KONSYL, SUGAR,) 3.4 gram packet Take 1 packet by mouth 2 (two) times daily.      rosuvastatin (CRESTOR) 20 MG tablet Take 1 tablet (20 mg total) by mouth once daily. 90 tablet 3    rosuvastatin (CRESTOR) 20 MG tablet Take 1 tablet by mouth daily 90 tablet 2    semaglutide (OZEMPIC) 2 mg/dose (8 mg/3 mL) PnIj Inject 2 mg into the skin once a week 3 mL 2    tadalafiL (CIALIS) 20 MG Tab Take 1 tablet by mouth as needed for erectile dysfunction (Patient taking differently: Take 20 mg by mouth daily as needed.) 18 tablet 10    topiramate (TOPAMAX) 200 MG Tab Take 2 tablets by mouth once daily (at bedtime) (Patient not taking: Reported on 4/8/2025) 180 tablet 3    traMADoL (ULTRAM) 50 mg tablet Take 1 tablet by mouth every 6 hours as needed for pain (Patient not taking: Reported on 4/8/2025) 12 tablet 0    traMADoL (ULTRAM) 50 mg tablet Take 1 tablet (50 mg total) by mouth every 6 (six) hours as needed for pain. (Patient not taking: Reported on 4/8/2025) 12 tablet 0    traZODone (DESYREL) 100 MG tablet Take 1 and 1/2 tablet by mouth every night at bedtime (Patient not taking: Reported on 4/8/2025) 135 tablet 3    traZODone (DESYREL) 100 MG tablet Take 2 tablets (200 mg total) by mouth nightly. (Patient not taking: Reported on 4/8/2025) 180 tablet 2    traZODone (DESYREL) 100 MG tablet Take 1.5 tablets (150 mg total) by mouth nightly. 135 tablet 0    venlafaxine (EFFEXOR XR) 37.5 MG 24 hr capsule TAKE ONE CAPSULE BY MOUTH DAILY FOR ONE WEEK. AFTER 1 WEEK, TAKE TWO CAPSULES DAILY 180 capsule 0    vilazodone (VIIBRYD) 20 mg Tab Take 1 tablet by mouth every night at bedtime 90 tablet 3    vilazodone (VIIBRYD) 40 mg Tab tablet Take 1 tablet by mouth once a day (Patient  "taking differently: Take 40 mg by mouth nightly.) 90 tablet 0    zolpidem (AMBIEN) 5 MG Tab Take 1 and 1/2 tablets by mouth once daily. (Patient not taking: Reported on 4/8/2025) 135 tablet 1    zolpidem (AMBIEN) 5 MG Tab Take 1 tablet by mouth every night at bedtime (Patient not taking: Reported on 4/8/2025) 90 tablet 0    zolpidem (AMBIEN) 5 MG Tab Take 1 to 1 & 1/2 tablet by mouth once a day 135 tablet 0    [DISCONTINUED] latanoprost 0.005 % ophthalmic solution Instill 1 drop into both eyes every evening 17.5 mL 0     No current facility-administered medications on file prior to visit.       Exam:  /70   Pulse (!) 59   Ht 5' 3" (1.6 m)   Wt 74.8 kg (165 lb)   BMI 29.23 kg/m²     Constitutional  Well-developed, well-nourished, appears stated age   Cardiovascular  No LE edema bilaterally   Neurological    * Mental status  MOCA = not done during today's visit     - Orientation  Oriented to conversation     - Memory   Intact recent and remote     - Attention/concentration  Attentive, vigilant during exam     - Language  Intact to conversation.     - Fund of knowledge  Aware of current events     - Executive  Well-organized thoughts     - Other     * Cranial nerves       - CN II  Pupils equal, visual fields full to confrontation  - Some difficulties with vision on the right     - CN III, IV, VI  Extraocular movements full, normal pursuits and saccades         - CN VII  Face strong and symmetric bilaterally     - CN VIII  Hearing intact bilaterally         - CN XI  SCM and trapezius 5/5 bilaterally     - CN XII  Tongue midline   * Motor  Grossly intact   * Sensory   Intact to light touch and temperature throughout - maybe mildly reduced vibration sensation at the ankles bilaterally    * Coordination  No dysmetria with finger-to-nose or heel-to-shin however there was some difficulties getting to target on the right with FNF improved with re-focusing    There was no apraxia or myoclonus.     Romberg negative.  "   * Gait  See below.   * Deep tendon reflexes  2+ and symmetric throughout   Negative owens    * Specialized movement exam Gen: normal facial expressions, anxious appearing   Speech: slightly hypophonic  Tremor: very mild left hand rest tremor - same with posture and action   - some difficulties with FNF on the right (likely vision related)   Bradykinesia: none - just needs reassurance -- some hesistation at times   Tone: Maybe slightly increased in the RUE but had a very difficult time relaxing  Gait: right leg appears maybe a little dystonic - only has difficulties with turning to the right - able to walk tandem with some difficulty  Pull Test: negative      Medical Record Review:  Labs, imaging and prior notes reviewed independently.     See above.     Diagnoses:          1. Abnormal gait  Neurofilament Light Chain    pTau-181, Plasma      2. Memory changes  Neurofilament Light Chain    pTau-181, Plasma            Assessment:  Mr. Brandon is a 70 yo LH man who presents today for abnormal balance. He has a family history of dementia. He denies any cognitive issues himself. He has debilitating anxiety that is not new. I believe his balance difficulties are multi-factorial in the setting of his vision issues, spinal stenosis, anxiety, and polypharmacy. I was able to review his MRI brain. I do think that there is perhaps some asymmetric hemispheric atrophy involving the right brain however he does not have any evidence of neglect, myoclonus, apraxia or aphasia to indicate atypical parkinsonism. He does not have bradykinesia on exam today however his gait does appear a bit more parkinsonian. That being said he was started on abilfy for his depression which may be contributing. Will need to continue to monitor with time.     Plan:      - Nutritional workup negative.   - Completed neuro PT but didn't find it helpful. I did encourage he consider another round of PT for balance should near falls occur.   - ENT states  no inner ear pathology and that his symptoms are central. SWJs present.  - Encouraged to correct eye pathology as this is likely contributing to his balance as well.   - Continue to work with psychiatry to optimize anxiety/depression regimen. 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   - Will need continued monitoring as above. May consider syn one in the future.   - I have ordered ND labs today.     Daughter: Madeleine Lowe MD.    The visit today is associated with current or anticipated ongoing medical care related to this patients complex condition. Patient should RTC in 3 months to see me.     Total time: 88 minutes spent on the encounter, which includes face to face time and non-face to face time preparing to see the patient (eg, review of tests), Obtaining and/or reviewing separately obtained history, Documenting clinical information in the electronic or other health record, Independently interpreting results (not separately reported) and communicating results to the patient/family/caregiver, or Care coordination (not separately reported).     Barbara Waldrop MD  Division of Movement and Memory Disorders  [facility]  [phone]

## 2025-05-06 PROBLEM — G21.19 DRUG-INDUCED PARKINSONISM: Status: ACTIVE | Noted: 2025-05-06

## 2025-05-06 PROBLEM — R41.3 MEMORY CHANGES: Status: ACTIVE | Noted: 2025-05-06

## 2025-05-06 LAB — NEUROFILAMENT LIGHT CHAIN, PLASMA: 76.6 PG/ML

## 2025-05-07 ENCOUNTER — TELEPHONE (OUTPATIENT)
Facility: CLINIC | Age: 70
End: 2025-05-07
Payer: COMMERCIAL

## 2025-05-07 NOTE — TELEPHONE ENCOUNTER
----- Message from [employer] sent at 5/7/2025  1:33 PM CDT -----  Type: General Call Back Name of Caller:ptWould the patient rather a call back or a response via [facility]? [employer] Call Back Number:[phone] Additional Information: Pt would like a call back regarding blood test results message sent to [employer]. Pt can not access [employer].

## 2025-05-08 ENCOUNTER — RESULTS FOLLOW-UP (OUTPATIENT)
Facility: CLINIC | Age: 70
End: 2025-05-08

## 2025-05-08 LAB — LC PHOSPHO-TAU (181P): 0.5 PG/ML (ref 0–0.97)

## 2025-05-09 ENCOUNTER — PATIENT MESSAGE (OUTPATIENT)
Facility: CLINIC | Age: 70
End: 2025-05-09
Payer: COMMERCIAL

## 2025-05-19 ENCOUNTER — OFFICE VISIT (OUTPATIENT)
Dept: OPHTHALMOLOGY | Facility: CLINIC | Age: 70
End: 2025-05-19
Payer: COMMERCIAL

## 2025-05-19 DIAGNOSIS — Z96.1 PSEUDOPHAKIA OF BOTH EYES: ICD-10-CM

## 2025-05-19 DIAGNOSIS — H40.1123 PRIMARY OPEN ANGLE GLAUCOMA (POAG) OF LEFT EYE, SEVERE STAGE: ICD-10-CM

## 2025-05-19 DIAGNOSIS — H40.1113 PRIMARY OPEN ANGLE GLAUCOMA (POAG) OF RIGHT EYE, SEVERE STAGE: ICD-10-CM

## 2025-05-19 DIAGNOSIS — Z96.89 HISTORY OF GLAUCOMA TUBE SHUNT PROCEDURE: Primary | ICD-10-CM

## 2025-05-19 PROCEDURE — 99999 PR PBB SHADOW E&M-EST. PATIENT-LVL II: CPT | Mod: PBBFAC,,, | Performed by: OPHTHALMOLOGY

## 2025-05-19 PROCEDURE — 99024 POSTOP FOLLOW-UP VISIT: CPT | Mod: S$GLB,,, | Performed by: OPHTHALMOLOGY

## 2025-05-19 NOTE — PROGRESS NOTES
HPI    Pt states his left eye is feeling well today. No pain. No photophobia. No   discharge.    APD OD  PCIOL OU  POAG OU    Meds:  MZM 50MG TID PO  PF 0/4  Cosopt 0/2  Brimonidine 0/2  Rocklatan 0/1    Last edited by Jaxson Noel on 5/19/2025  1:47 PM.            Assessment /Plan     For exam results, see Encounter Report.    History of glaucoma tube shunt procedure    Primary open angle glaucoma (POAG) of right eye, severe stage    Primary open angle glaucoma (POAG) of left eye, severe stage    Pseudophakia of both eyes      Dr Stanford x 30 years    Patient with sig other  Self employed : Financing // Loans    Advanced POAG   + APD OD    + Steroids to Knees --> discussed steroid response and hold until further notice    CCT  541 // 505    Mid -low teens --> not achieved and discussed options  --> meds & GDD    Both eyes --> tolerating well & good adherence --> CSM  Cosopt TID --> consider BID 2/2 [place]  Rocklatan q day  Brimonidine  Diamox 500 mg PO BID --> Holding  --> GI intolerant // diarrhea / dehydration --> improved  Neptazine 50 PO TID     Hydrus OU @ June 2024  ?? Patent OU    [facility] for LASER --> G-probe Dr Esparza @08/2024 12/19/2024  Pre-Tx 37  OD YAG disruption // puncture of Hydrus --> possible response 12/31/2024 --> 29    SP OD G-Probe CPC  Andrew Esparza at [facility] 08/29/2024  Transscleral Diode Laser Cyclophotocoagulation (CPC) PROCEDURE:  Retrobulbar block performed  The G-probe was applied to the sclera with the footplate of the probe tipe directed away from the cornea with the laser directed over the ciliary processes between 1 to 2 mm posterior to the limbus.  Diode laser settings: 1250 mW power, 4 sec duration  Treatment of the ciliary processes was performed 360 degrees: 24 shots in 4 quadrants (3' and 9' oclock meridians spared)    PC IOL OU  Quiet  Observe    12/05/2024  Color better than contour              05/19/2025  POW#3 s/p  (3 fenestrations) OS 4/10/25 Coulon  S/p BGI  350 (3 fenestrations) OD 1/16/25 Coulon, uneventful opening 2/26/25    Post-op glaucoma surgery Left Eye, POW 6: Patient doing well, reviewed post-op instructions handout with limited activity & eye care instructions, eye drop therapy and endophthalmitis precautions.  The patient and family voice good understanding and will return as scheduled or sooner as needed.    Plan:  Adjusting:  - Continue PF 0/4  OS A 1% Q day  start  Cosopt 0/2 --> 0/1  Brim 0/2 --> hold  Rock 0/1 --> hold  MZM 50 mg PO TID  --> hold    RTC 1 weeks IOP & adherence --> check for APD

## 2025-05-23 ENCOUNTER — TELEPHONE (OUTPATIENT)
Facility: CLINIC | Age: 70
End: 2025-05-23
Payer: COMMERCIAL

## 2025-05-27 ENCOUNTER — OFFICE VISIT (OUTPATIENT)
Dept: OPHTHALMOLOGY | Facility: CLINIC | Age: 70
End: 2025-05-27
Payer: COMMERCIAL

## 2025-05-27 ENCOUNTER — TELEPHONE (OUTPATIENT)
Facility: CLINIC | Age: 70
End: 2025-05-27
Payer: COMMERCIAL

## 2025-05-27 DIAGNOSIS — H21.562 AFFERENT PUPILLARY DEFECT OF LEFT EYE: ICD-10-CM

## 2025-05-27 DIAGNOSIS — Z96.89 HISTORY OF GLAUCOMA TUBE SHUNT PROCEDURE: Primary | ICD-10-CM

## 2025-05-27 DIAGNOSIS — Z96.1 PSEUDOPHAKIA OF BOTH EYES: ICD-10-CM

## 2025-05-27 DIAGNOSIS — H40.1133 PRIMARY OPEN ANGLE GLAUCOMA (POAG) OF BOTH EYES, SEVERE STAGE: ICD-10-CM

## 2025-05-27 PROBLEM — H40.1123 PRIMARY OPEN ANGLE GLAUCOMA (POAG) OF LEFT EYE, SEVERE STAGE: Status: RESOLVED | Noted: 2025-04-10 | Resolved: 2025-05-27

## 2025-05-27 PROBLEM — H40.1113 PRIMARY OPEN ANGLE GLAUCOMA (POAG) OF RIGHT EYE, SEVERE STAGE: Status: RESOLVED | Noted: 2025-01-16 | Resolved: 2025-05-27

## 2025-05-27 PROCEDURE — 99999 PR PBB SHADOW E&M-EST. PATIENT-LVL IV: CPT | Mod: PBBFAC,,, | Performed by: OPHTHALMOLOGY

## 2025-05-27 PROCEDURE — 99024 POSTOP FOLLOW-UP VISIT: CPT | Mod: S$GLB,,, | Performed by: OPHTHALMOLOGY

## 2025-05-27 NOTE — PROGRESS NOTES
HPI    DLS: 5/19/25 w/ Dr. Bonilla    69 y.o. male presents for post op visit. Complains of cloudy VA OS,   limited peripheral vision OD, glare/light sensitivity, quick pain OS a   week ago. Denies headaches, flashes, and floaters.     PCIOL OU  POAG OU    Meds:  PF 0/4  Cosopt 0/1  Rocklatan 0/1    Last edited by Makayla Cardenas on 5/27/2025  1:30 PM.            Assessment /Plan     For exam results, see Encounter Report.    History of glaucoma tube shunt procedure    Primary open angle glaucoma (POAG) of both eyes, severe stage    Pseudophakia of both eyes    Afferent pupillary defect of left eye      Dr Stanford x 30 years    Patient with sig other  Self employed : Financing // Loans  Anxiety    Advanced POAG   + APD OD    + Steroids to Knees --> discussed steroid response and hold until further notice    CCT  541 // 505    Mid -low teens --> not achieved and discussed options  --> meds & GDD    Both eyes --> tolerating well & good adherence --> CSM  Cosopt TID --> consider BID 2/2 [place]  Rocklatan q day  Brimonidine  Diamox 500 mg PO BID --> Holding  --> GI intolerant // diarrhea / dehydration --> improved  Neptazine 50 PO TID     Hydrus OU @ June 2024  ?? Patent OU    [facility] for LASER --> G-probe Dr Esparza @08/2024 12/19/2024  Pre-Tx 37  OD YAG disruption // puncture of Hydrus --> possible response 12/31/2024 --> 29    SP OD G-Probe CPC  Andrew Esparza at [place] 08/29/2024  Transscleral Diode Laser Cyclophotocoagulation (CPC) PROCEDURE:  Retrobulbar block performed  The G-probe was applied to the sclera with the footplate of the probe tipe directed away from the cornea with the laser directed over the ciliary processes between 1 to 2 mm posterior to the limbus.  Diode laser settings: 1250 mW power, 4 sec duration  Treatment of the ciliary processes was performed 360 degrees: 24 shots in 4 quadrants (3' and 9' oclock meridians spared)    PC IOL OU  Quiet  Observe    12/05/2024  Color better than  contour              05/27/2025  POW#3 s/p  (3 fenestrations) OS 4/10/25 Coulon  S/p  (3 fenestrations) OD 1/16/25 Coulon, uneventful opening 2/26/25    Post-op glaucoma surgery Left Eye, POW 6: Patient doing well, reviewed post-op instructions handout with limited activity & eye care instructions, eye drop therapy and endophthalmitis precautions.  The patient and family voice good understanding and will return as scheduled or sooner as needed.    Dilated OS 2/2 A 1% use --> blurred Va with reading WSJ  Reassurance    Blepharitis: Discussed treatment options including warm compresses, lid scrubs and medications.  WCs  Lid scrubs     Plan:  Adjusting:  Continue PF 0/4 x 1 week --> then 0/2  OS A 1% Q day  --> hold  Cosopt 0/2 --> 0/1 --> 0/2  Brim 0/2 --> hold  Rock 0/1 --> hold  MZM 50 mg PO TID  --> hold        RTC 2-3 weeks IOP & adherence -->   RTc sooner prn with good understanding

## 2025-06-23 ENCOUNTER — OFFICE VISIT (OUTPATIENT)
Dept: OPHTHALMOLOGY | Facility: CLINIC | Age: 70
End: 2025-06-23
Payer: COMMERCIAL

## 2025-06-23 DIAGNOSIS — Z96.89 HISTORY OF GLAUCOMA TUBE SHUNT PROCEDURE: ICD-10-CM

## 2025-06-23 DIAGNOSIS — Z96.1 PSEUDOPHAKIA OF BOTH EYES: ICD-10-CM

## 2025-06-23 DIAGNOSIS — H21.562 AFFERENT PUPILLARY DEFECT OF LEFT EYE: ICD-10-CM

## 2025-06-23 DIAGNOSIS — H40.1133 PRIMARY OPEN ANGLE GLAUCOMA (POAG) OF BOTH EYES, SEVERE STAGE: Primary | ICD-10-CM

## 2025-06-23 PROCEDURE — 99024 POSTOP FOLLOW-UP VISIT: CPT | Mod: S$GLB,,, | Performed by: OPHTHALMOLOGY

## 2025-06-23 PROCEDURE — 99999 PR PBB SHADOW E&M-EST. PATIENT-LVL IV: CPT | Mod: PBBFAC,,, | Performed by: OPHTHALMOLOGY

## 2025-06-23 NOTE — PROGRESS NOTES
HPI    DLS: 05/27/2025  Post op of Dr. Etienne   IOP check   Pt states doing well   S/P  OS 04/10/025  S/P  OD 01/16/2025  PC IOL OU     Cosopt BID OS   PF BID OS     Last edited by Sarah Huizar MA on 6/23/2025  1:23 PM.            Assessment /Plan     For exam results, see Encounter Report.    Primary open angle glaucoma (POAG) of both eyes, severe stage    Afferent pupillary defect of left eye    Pseudophakia of both eyes    History of glaucoma tube shunt procedure      Dr Stanford x 30 years    Patient with sig other  Self employed : Financing // Loans  Anxiety    Advanced POAG   + APD OD    + Steroids to Knees --> discussed steroid response and hold until further notice    CCT  541 // 505    Mid -low teens --> not achieved and discussed options  --> meds & GDD    Both eyes --> tolerating well & good adherence --> CSM  Cosopt TID --> consider BID 2/2 [place]  Rocklatan q day  Brimonidine  Diamox 500 mg PO BID --> Holding  --> GI intolerant // diarrhea / dehydration --> improved  Neptazine 50 PO TID     Hydrus OU @ June 2024  ?? Patent OU    [facility]ER --> G-probe Dr Esparza @08/2024 12/19/2024  Pre-Tx 37  OD YAG disruption // puncture of Hydrus --> possible response 12/31/2024 --> 29    SP OD G-Probe CPC  Andrew Esparza at [place] 08/29/2024  Transscleral Diode Laser Cyclophotocoagulation (CPC) PROCEDURE:  Retrobulbar block performed  The G-probe was applied to the sclera with the footplate of the probe tipe directed away from the cornea with the laser directed over the ciliary processes between 1 to 2 mm posterior to the limbus.  Diode laser settings: 1250 mW power, 4 sec duration  Treatment of the ciliary processes was performed 360 degrees: 24 shots in 4 quadrants (3' and 9' oclock meridians spared)    PC IOL OU  Quiet  Observe    12/05/2024  Color better than contour              06/23/2025  POW#3 s/p  (3 fenestrations) OS 4/10/25 [place]  S/p  (3 fenestrations) OD 1/16/25  Jaimie, uneventful opening 2/26/25    Post-op glaucoma surgery Left Eye, POW 10: Patient doing well, reviewed post-op instructions handout with limited activity & eye care instructions, eye drop therapy and endophthalmitis precautions.  The patient and family voice good understanding and will return as scheduled or sooner as needed.      Blepharitis: Discussed treatment options including warm compresses, lid scrubs and medications.  PF ATs   WCs  Lid scrubs     Plan:  Discussed  Continue PF  0/2  Cosopt 0/2      RTC 3-4 weeks IOP & adherence --> watch for steroid response  RTc sooner prn with good understanding

## 2025-07-23 ENCOUNTER — TELEPHONE (OUTPATIENT)
Facility: CLINIC | Age: 70
End: 2025-07-23
Payer: COMMERCIAL

## 2025-07-23 NOTE — TELEPHONE ENCOUNTER
Placed outgoing call to patient regarding  on Syn-One skin biopsy test 7/30 10 am.    Reviewed with patient:  To reduce risk of bleeding, please stop any blood thinners by This includes aspirin, naproxen, ibuprofen and fish oil and NSAIDS for 4 days prior to your procedure. Please also hold Plavix, Xarelto or Coumadin.  We will have to cancel and reschedule your appt if any of the above medications are taken prior to the biopsy.     Please take your shower prior to the procedure and wear loose comfortable clothing. Barbara will be doing a small punch biopsy on your upper back, thigh and ankle area. You may shower the next day.The results typically take about 3 weeks to come back.     Verbalized understanding and thanks.

## 2025-07-30 ENCOUNTER — PROCEDURE VISIT (OUTPATIENT)
Facility: CLINIC | Age: 70
End: 2025-07-30
Payer: COMMERCIAL

## 2025-07-30 VITALS
WEIGHT: 175 LBS | DIASTOLIC BLOOD PRESSURE: 88 MMHG | HEIGHT: 63 IN | SYSTOLIC BLOOD PRESSURE: 134 MMHG | BODY MASS INDEX: 31.01 KG/M2 | HEART RATE: 64 BPM

## 2025-07-30 DIAGNOSIS — G21.19 DRUG-INDUCED PARKINSONISM: Primary | ICD-10-CM

## 2025-07-30 PROCEDURE — 11104 PUNCH BX SKIN SINGLE LESION: CPT | Mod: S$GLB,,, | Performed by: PHYSICIAN ASSISTANT

## 2025-07-30 PROCEDURE — 11105 PUNCH BX SKIN EA SEP/ADDL: CPT | Mod: S$GLB,,, | Performed by: PHYSICIAN ASSISTANT

## 2025-07-30 NOTE — PROCEDURES
[facility]     PATIENT: Gualberto Brandon  VISIT DATE: 2025  MRN: [id number]  PRIMARY PROVIDER: No primary care provider on file.  : 1955    PRE-OP DIAGNOSIS: Parkinsonism   POST-OP DIAGNOSIS: Same   PROCEDURE: skin punch biopsy    Performing Provider: Barbara Cochran PA-C  Supervising Physician (if applicable): Dr. Claude Bertrand      PROCEDURE:   _  Shave Biopsy  X  Punch Biopsy  _  Incisional Biopsy    DESCRIPTION:  - Punch Size: 0.5 cm  - Number of Punch Biopsies: 3  + CPT 13310 (punch biopsy, neck) 1st procedure  + CPT 87290 (punch biopsy, each additional lesion, thigh) 2nd procedure  + CPT 74383 (punch biopsy, each additional lesion, ankle) 3rd procedure    The area surrounding the skin lesion was prepared and draped in the  usual sterile manner. The lesion was removed in the usual manner by punch biopsy method noted above. Three full thickness cylindrical samples of the skin were removed from the upper back, lower thigh, and lower leg. The intent of the biopsy is to remove a sample of a cutaneous lesion for Syn-One diagnostic pathologic examination. Hemostasis was assured. The patient tolerated  the procedure well.     Closure:       _  Monsels for hemostasis                _  Suture   X Simple closure  _ None    Followup: The patient tolerated the procedure well without  complications.  Standard post-procedure care is explained and return  precautions are given.    Pathology/biopsy specimens were sent directly to [facility] for processing. Pathology was not sent via in-house [facility] laboratory. Pathology results will be returned within 4-6 weeks and scanned into [facility] Electronic Medical Record.

## 2025-08-04 ENCOUNTER — OFFICE VISIT (OUTPATIENT)
Dept: OPHTHALMOLOGY | Facility: CLINIC | Age: 70
End: 2025-08-04
Payer: COMMERCIAL

## 2025-08-04 DIAGNOSIS — Z96.89 HISTORY OF GLAUCOMA TUBE SHUNT PROCEDURE: ICD-10-CM

## 2025-08-04 DIAGNOSIS — H21.562 AFFERENT PUPILLARY DEFECT OF LEFT EYE: ICD-10-CM

## 2025-08-04 DIAGNOSIS — Z96.1 PSEUDOPHAKIA OF BOTH EYES: ICD-10-CM

## 2025-08-04 DIAGNOSIS — H40.1133 PRIMARY OPEN ANGLE GLAUCOMA (POAG) OF BOTH EYES, SEVERE STAGE: Primary | ICD-10-CM

## 2025-08-04 PROCEDURE — G2211 COMPLEX E/M VISIT ADD ON: HCPCS | Mod: S$GLB,,, | Performed by: OPHTHALMOLOGY

## 2025-08-04 PROCEDURE — 99999 PR PBB SHADOW E&M-EST. PATIENT-LVL IV: CPT | Mod: PBBFAC,,, | Performed by: OPHTHALMOLOGY

## 2025-08-04 PROCEDURE — 99214 OFFICE O/P EST MOD 30 MIN: CPT | Mod: S$GLB,,, | Performed by: OPHTHALMOLOGY

## 2025-08-04 NOTE — PROGRESS NOTES
HPI     Glaucoma     Additional comments: 1 mon iop chk           Comments    Patient states that vision seems about the same as last visit in both   eyes.    S/P  OS 04/10/025  S/P  OD 01/16/2025  PC IOL OU     Cosopt BID OS   PF BID OS             Last edited by Jaxson Noel on 8/4/2025  1:29 PM.            Assessment /Plan     For exam results, see Encounter Report.    Primary open angle glaucoma (POAG) of both eyes, severe stage  -     Posterior Segment OCT Optic Nerve- Both eyes; Future  -     Cedillo Visual Field - OU - Extended - Both Eyes; Future    Pseudophakia of both eyes    Afferent pupillary defect of left eye    History of glaucoma tube shunt procedure      HPI     Glaucoma     Additional comments: 1 mon iop chk           Comments    Patient states that vision seems about the same as last visit in both   eyes.    S/P  OS 04/10/025  S/P  OD 01/16/2025  PC IOL OU     Cosopt BID OS   PF BID OS             Last edited by Jaxson Noel on 8/4/2025  1:29 PM.            Assessment /Plan     For exam results, see Encounter Report.    Primary open angle glaucoma (POAG) of both eyes, severe stage  -     Posterior Segment OCT Optic Nerve- Both eyes; Future  -     Cedillo Visual Field - OU - Extended - Both Eyes; Future    Pseudophakia of both eyes    Afferent pupillary defect of left eye    History of glaucoma tube shunt procedure      Dr Stanford x 30 years    Patient with sig other  Self employed : Financing // Loans  Anxiety    Advanced POAG   + APD OD    + Steroids to Knees --> discussed steroid response and hold until further notice    CCT  541 // 505    Mid -low teens -->  achieved and discussed    Left eye --> tolerating well & good adherence --> CSM  Cosopt BID  PF 1% BID    OS S/p  (3 fenestrations) OS 4/10/25 [place]  OD S/p  (3 fenestrations) OD 1/16/25 [place], uneventful opening 2/26/25    Hydrus OU @ June 2024  ?? Patent OU    [facility] for [facility]ER --> G-probe  Dr Esparza @08/2024 12/19/2024  Pre-Tx 37  OD YAG disruption // puncture of Hydrus --> possible response 12/31/2024 --> 29    SP OD G-Probe CPC  Andrew Esparza at [place] 08/29/2024  Transscleral Diode Laser Cyclophotocoagulation (CPC) PROCEDURE:  Retrobulbar block performed  The G-probe was applied to the sclera with the footplate of the probe tipe directed away from the cornea with the laser directed over the ciliary processes between 1 to 2 mm posterior to the limbus.  Diode laser settings: 1250 mW power, 4 sec duration  Treatment of the ciliary processes was performed 360 degrees: 24 shots in 4 quadrants (3' and 9' oclock meridians spared)    PC IOL OU  Quiet  Observe    Blepharitis: Discussed treatment options including warm compresses, lid scrubs and medications.  PF ATs   WCs  Lid scrubs     12/05/2024  Color better than contour              RTC 3 Months IOP & adherence & HVF Faster & OCT RNFL --> watch for steroid response  RTc sooner prn with good understanding

## 2025-09-03 ENCOUNTER — TELEPHONE (OUTPATIENT)
Facility: CLINIC | Age: 70
End: 2025-09-03
Payer: COMMERCIAL

## (undated) DEVICE — SOL BETADINE 5%

## (undated) DEVICE — PACK CATARACT OPTHALMIC CUSTOM

## (undated) DEVICE — KNIFE ANGLE 1MM

## (undated) DEVICE — NDL BOX COUNTER

## (undated) DEVICE — SPONGE WEC CEL SPEARS

## (undated) DEVICE — SHIELD COLLAGEN 12HR CORNEAL

## (undated) DEVICE — DRAPE THREE-QTR REINF 53X77IN

## (undated) DEVICE — DRESSING TRANS 4X4 TEGADERM

## (undated) DEVICE — SUT CTD VICRYL 7-0 TG1408T

## (undated) DEVICE — KIT MEROCEL INSTRUMENT WIPE

## (undated) DEVICE — DRAPE STERI INCISE MED 130X130

## (undated) DEVICE — DRAPE OPHTHALMIC 48X62 FEN

## (undated) DEVICE — SYR 3CC LUER LOC